# Patient Record
Sex: FEMALE | Race: OTHER | NOT HISPANIC OR LATINO | Employment: FULL TIME | URBAN - METROPOLITAN AREA
[De-identification: names, ages, dates, MRNs, and addresses within clinical notes are randomized per-mention and may not be internally consistent; named-entity substitution may affect disease eponyms.]

---

## 2017-05-24 ENCOUNTER — APPOINTMENT (OUTPATIENT)
Dept: MEDICAL GROUP | Facility: PHYSICIAN GROUP | Age: 32
End: 2017-05-24
Payer: COMMERCIAL

## 2017-06-05 RX ORDER — NORGESTIMATE AND ETHINYL ESTRADIOL 7DAYSX3 28
KIT ORAL
Qty: 84 TAB | Refills: 0 | OUTPATIENT
Start: 2017-06-05

## 2017-06-06 NOTE — TELEPHONE ENCOUNTER
I made patient an appointment to see  for birth control refill at earliest opened appointment which was 07/12/2017. Patient would like a refill in the mean time until then. LM

## 2017-06-07 NOTE — TELEPHONE ENCOUNTER
I reviewed her chart and it looks like I have not prescribed this medication before. I called patient and spoke with . Patient is sleeping. I instructed  have patient call us at [phone] to talk to Brenda otherwise I will get back with her on Thursday since I don't work tomorrow.

## 2017-06-12 ENCOUNTER — OFFICE VISIT (OUTPATIENT)
Dept: MEDICAL GROUP | Facility: PHYSICIAN GROUP | Age: 32
End: 2017-06-12
Payer: COMMERCIAL

## 2017-06-12 VITALS
HEART RATE: 68 BPM | HEIGHT: 64 IN | BODY MASS INDEX: 34.66 KG/M2 | TEMPERATURE: 97.5 F | WEIGHT: 203 LBS | DIASTOLIC BLOOD PRESSURE: 60 MMHG | OXYGEN SATURATION: 98 % | SYSTOLIC BLOOD PRESSURE: 94 MMHG

## 2017-06-12 DIAGNOSIS — Z13.220 ENCOUNTER FOR SCREENING FOR LIPID DISORDER: ICD-10-CM

## 2017-06-12 DIAGNOSIS — F43.21 SITUATIONAL DEPRESSION: ICD-10-CM

## 2017-06-12 DIAGNOSIS — Z13.21 ENCOUNTER FOR VITAMIN DEFICIENCY SCREENING: ICD-10-CM

## 2017-06-12 DIAGNOSIS — Z86.32 HISTORY OF GESTATIONAL DIABETES: ICD-10-CM

## 2017-06-12 DIAGNOSIS — E66.9 OBESITY (BMI 30-39.9): ICD-10-CM

## 2017-06-12 DIAGNOSIS — Z30.015 ENCOUNTER FOR INITIAL PRESCRIPTION OF VAGINAL RING HORMONAL CONTRACEPTIVE: ICD-10-CM

## 2017-06-12 DIAGNOSIS — Z13.29 SCREENING FOR THYROID DISORDER: ICD-10-CM

## 2017-06-12 PROCEDURE — 99214 OFFICE O/P EST MOD 30 MIN: CPT | Performed by: FAMILY MEDICINE

## 2017-06-12 RX ORDER — ETONOGESTREL AND ETHINYL ESTRADIOL VAGINAL RING .015; .12 MG/D; MG/D
RING VAGINAL
Qty: 3 EACH | Refills: 3 | Status: SHIPPED | OUTPATIENT
Start: 2017-06-12 | End: 2018-06-18

## 2017-06-12 ASSESSMENT — PAIN SCALES - GENERAL: PAINLEVEL: NO PAIN

## 2017-06-12 ASSESSMENT — PATIENT HEALTH QUESTIONNAIRE - PHQ9: CLINICAL INTERPRETATION OF PHQ2 SCORE: 2

## 2017-06-12 NOTE — MR AVS SNAPSHOT
"        Kristal Lopez   2017 2:20 PM   Office Visit   MRN: [id number]    Department:  [facility]   Dept Phone:  [phone]    Description:  Female : 1985   Provider:  Neeru Ghosh M.D.           Reason for Visit     Establish Care weight loss       Allergies as of 2017     No Active Allergies      You were diagnosed with     History of gestational diabetes   [646589]       Encounter for initial prescription of vaginal ring hormonal contraceptive   [5516392]       Obesity (BMI 30-39.9)   [670281]       Screening for thyroid disorder   [V77.0.ICD-9-CM]       Encounter for screening for lipid disorder   [2895656]       Encounter for vitamin deficiency screening   [397991]         Vital Signs     Blood Pressure Pulse Temperature Height Weight Body Mass Index    94/60 mmHg 68 36.4 °C (97.5 °F) 1.626 m (5' 4.02\") 92.08 kg (203 lb) 34.83 kg/m2    Oxygen Saturation Last Menstrual Period Breastfeeding? Smoking Status          98% 2017 No Never Smoker         Basic Information     Date Of Birth Sex Race Ethnicity Preferred Language    1985 Female Other Non- English      Your appointments     Jul 10, 2017  5:00 PM   Established Patient with Neeru Ghosh M.D.   [facility] ([address] [address] [phone]           You will be receiving a confirmation call a few days before your appointment from our automated call confirmation system.              Problem List              ICD-10-CM Priority Class Noted - Resolved    History of gestational diabetes Z86.32   2017 - Present    Encounter for initial prescription of vaginal ring hormonal contraceptive Z30.015   2017 - Present    Obesity (BMI 30-39.9) E66.9   2017 - Present      Health Maintenance        Date Due Completion Dates    PAP SMEAR 2018, 6/10/2013    IMM DTaP/Tdap/Td Vaccine (2 - Td) 2021            Current Immunizations    " Hepatitis A Vaccine, Adult 10/9/2015    Hepatitis B Vaccine Non-Recombivax (Ped/Adol) 10/30/2012, 4/16/2012, 2/24/2012    Influenza TIV (IM) 10/30/2012, 12/12/2011    Tdap Vaccine 12/12/2011      Below and/or attached are the medications your provider expects you to take. Review all of your home medications and newly ordered medications with your provider and/or pharmacist. Follow medication instructions as directed by your provider and/or pharmacist. Please keep your medication list with you and share with your provider. Update the information when medications are discontinued, doses are changed, or new medications (including over-the-counter products) are added; and carry medication information at all times in the event of emergency situations     Allergies:  No Known Allergies          Medications  Valid as of: June 12, 2017 -  3:18 PM    Generic Name Brand Name Tablet Size Instructions for use    Etonogestrel-Ethinyl Estradiol (RING) NUVARING 0.12-0.015 MG/24HR 1 ring vaginally X 3 weeks, off X 1 week        Naltrexone-Bupropion HCl (TABLET SR 12 HR) Naltrexone-Bupropion HCl ER 8-90 MG Take 1 tablet by mouth 2 Times a Day.        .                 Medicines prescribed today were sent to:     [facility]/PHARMACY #[id number] [facility], [place] - [address] [address]    Phone: [phone] Fax: [phone]    Open 24 Hours?: No      Medication refill instructions:       If your prescription bottle indicates you have medication refills left, it is not necessary to call your provider’s office. Please contact your pharmacy and they will refill your medication.    If your prescription bottle indicates you do not have any refills left, you may request refills at any time through one of the following ways: The online [employer] system (except Urgent Care), by calling your provider’s office, or by asking your pharmacy to contact your provider’s office with a refill request. Medication refills are processed only  during regular business hours and may not be available until the next business day. Your provider may request additional information or to have a follow-up visit with you prior to refilling your medication.   *Please Note: Medication refills are assigned a new Rx number when refilled electronically. Your pharmacy may indicate that no refills were authorized even though a new prescription for the same medication is available at the pharmacy. Please request the medicine by name with the pharmacy before contacting your provider for a refill.        Your To Do List     Future Labs/Procedures Complete By Expires    CBC WITH DIFFERENTIAL  As directed 6/13/2018    COMP METABOLIC PANEL  As directed 6/13/2018    HEMOGLOBIN A1C  As directed 6/13/2018    LIPID PROFILE  As directed 6/13/2018    TSH  As directed 6/13/2018    VITAMIN D,25 HYDROXY  As directed 6/13/2018         MyChart Status: Patient Declined

## 2017-06-12 NOTE — PROGRESS NOTES
Subjective:   Kristal Lopez is a 32 y.o. female here today to establish care with new provider and has the following concerns which include depressed mood, unable to lose weight.    1. Patient reports that she has felt sad and depressed for a long time. This is mostly related to stressors in her life including taking care of her kids and her relationship with her in-laws which is not good. She feels that her in-laws do not care for her and her family, however her  does not understand that and maintains good relationship with her in-laws. This bothers her from time to time and she feels sad about it occasionally. She denies any suicidal or homicidal ideation. In the past she was prescribed Prozac for her mood but she had taken it for one week and that has caused her irritability so she stopped.She also feels sad for not being able to lose weight.She does not want to be on any medication that can cause weight gain but she is willing try something that may help with her mood and also help with weight loss.    2. Patient reports that she has also been trying to lose weight but has been unsuccessful being very busy. She recently signed up for the gym and has been trying to go to the gym regularly but sometimes she falls off her schedule and occasionally she also tends to eat a lot when she is not doing enough physical activity. She also notes that she seems to have less energy.  She is wanting to try some medication for weight loss.    3. Patient recently has been on tri-Sprintec for contraception, but she often forgets to take the medication daily. Therefore she wants to try the Nuva Ring and is asking about it.    4. Patient has history of gestational diabetes with her pregnancies in 2007 and 2013. She has not had any routine blood tests in the recent past.     Current medicines (including changes today)  Current Outpatient Prescriptions   Medication Sig Dispense Refill   • Naltrexone-Bupropion HCl ER 8-90 MG  "TABLET SR 12 HR Take 1 tablet by mouth 2 Times a Day. 60 Tab 1   • ethinyl estradiol-etonogestrel (NUVARING) 0.12-0.015 MG/24HR vaginal ring 1 ring vaginally X 3 weeks, off X 1 week 3 Each 3     No current facility-administered medications for this visit.     She  has a past medical history of Pregnancy and Depressed affect (1/26/2016).    ROS   No chest pain, no shortness of breath, no abdominal pain  No nausea, vomiting  No cough, rash  No weakness, numbness, tingling       Objective:     Blood pressure 94/60, pulse 68, temperature 36.4 °C (97.5 °F), height 1.626 m (5' 4.02\"), weight 92.08 kg (203 lb), last menstrual period 06/12/2017, SpO2 98 %, not currently breastfeeding. Body mass index is 34.83 kg/(m^2).     PHYSICAL EXAM     GEN: Alert and oriented,well appearing, no acute distress  SKIN: warm, dry to touch, no rashes or lesions in visible areas  PSYCH: mood and affect normal, judgement normal  EYES: Conjunctiva clear, lids normal,pupils equal round and reactive  ENMT: Normal external nose and ears,EACs normal appearing, TM pearly gray with normal light reflex bilaterally,nasal mucosa and turbinates normal appearing without erythema or edema, lips without lesions,good dentition,oropharynx clear  Neck : Trachea midline, no masses or swelling, no thyromegaly  LYMPHATIC : No cervical or supraclavicular lymphadenopathy  RESPIRATORY : Unlabored respiratory effort, no distress noted, clear to auscultation bilaterally, no wheeze, rhonchi, crackles  CARDIOVASCULAR: RRR, S1 S2 normal, no murmurs , gallop , no carotid bruit, no edema of the extremities, pedal pulses B/L 2+  GI: Soft, Non tender, No rebound or guarding, no hepatosplenomegaly, no masses  MUSCULOSKELETAL : Normal gait and stance, no obvious abnormalities   NEURO: No overt focal neurologic deficits,sensation intact        Assessment and Plan:   The following treatment plan was discussed    1. History of gestational diabetes  New problem.No recent " labs.Will order HbA1C.  - HEMOGLOBIN A1C; Future    2. Encounter for initial prescription of vaginal ring hormonal contraceptive  New problem  Discussed method of administration and the possible side effects.Prescription given for Nuvaring.Advised insertion between day 1-5 of her menstrual cycle.She just started her menses today  - ethinyl estradiol-etonogestrel (NUVARING) 0.12-0.015 MG/24HR vaginal ring; 1 ring vaginally X 3 weeks, off X 1 week  Dispense: 3 Each; Refill: 3  - CBC WITH DIFFERENTIAL; Future  - COMP METABOLIC PANEL; Future    3. Obesity (BMI 30-39.9)  New problem.Rx given for Contrave and online discount coupon printed from Contrave website.  Discussed possible side effects.Continue diet and exercise.Reevaluate in 4 weeks.  If Contrave not covered we will try Wellbutrin which will help with her mood and also have additional weight loss   - Naltrexone-Bupropion HCl ER 8-90 MG TABLET SR 12 HR; Take 1 tablet by mouth 2 Times a Day.  Dispense: 60 Tab; Refill: 1  - Patient identified as having weight management issue.  Appropriate orders and counseling given.    4. Screening for thyroid disorder  - TSH; Future    5. Encounter for screening for lipid disorder  - LIPID PROFILE; Future    6. Encounter for vitamin deficiency screening  - VITAMIN D,25 HYDROXY; Future    7. Situational depression  Patient prescribed Contrave for weight loss.If insurance does not cover, we will try wellbutrin taht will likely help with both mood and weight loss.            Followup: Return in about 4 weeks (around 7/10/2017).         Please note that this dictation was created using voice recognition software. I have made every reasonable attempt to correct obvious errors, but I expect that there are errors of grammar and possibly content that I did not discover before finalizing the note.

## 2017-06-13 ENCOUNTER — TELEPHONE (OUTPATIENT)
Dept: MEDICAL GROUP | Facility: PHYSICIAN GROUP | Age: 32
End: 2017-06-13

## 2017-06-13 PROBLEM — F43.21 SITUATIONAL DEPRESSION: Status: ACTIVE | Noted: 2017-06-13

## 2017-06-13 RX ORDER — BUPROPION HYDROCHLORIDE 150 MG/1
150 TABLET, EXTENDED RELEASE ORAL 2 TIMES DAILY
Qty: 60 TAB | Refills: 3 | Status: SHIPPED | OUTPATIENT
Start: 2017-06-13 | End: 2018-06-18

## 2017-06-13 NOTE — TELEPHONE ENCOUNTER
Patient requesting Bupropion as discussed in her appt because the contrave weight loss medication it not covered on her insurance

## 2017-10-07 ENCOUNTER — HOSPITAL ENCOUNTER (EMERGENCY)
Facility: MEDICAL CENTER | Age: 32
End: 2017-10-07
Attending: EMERGENCY MEDICINE
Payer: COMMERCIAL

## 2017-10-07 ENCOUNTER — HOSPITAL ENCOUNTER (OUTPATIENT)
Dept: RADIOLOGY | Facility: MEDICAL CENTER | Age: 32
End: 2017-10-07
Attending: PHYSICIAN ASSISTANT
Payer: COMMERCIAL

## 2017-10-07 ENCOUNTER — OFFICE VISIT (OUTPATIENT)
Dept: URGENT CARE | Facility: PHYSICIAN GROUP | Age: 32
End: 2017-10-07
Payer: COMMERCIAL

## 2017-10-07 VITALS
DIASTOLIC BLOOD PRESSURE: 70 MMHG | HEART RATE: 84 BPM | WEIGHT: 205.69 LBS | TEMPERATURE: 99.8 F | HEIGHT: 64 IN | OXYGEN SATURATION: 98 % | RESPIRATION RATE: 16 BRPM | BODY MASS INDEX: 35.12 KG/M2 | SYSTOLIC BLOOD PRESSURE: 136 MMHG

## 2017-10-07 VITALS
RESPIRATION RATE: 16 BRPM | SYSTOLIC BLOOD PRESSURE: 100 MMHG | OXYGEN SATURATION: 100 % | TEMPERATURE: 98.1 F | HEART RATE: 72 BPM | HEIGHT: 64 IN | DIASTOLIC BLOOD PRESSURE: 60 MMHG

## 2017-10-07 DIAGNOSIS — F43.21 SITUATIONAL DEPRESSION: ICD-10-CM

## 2017-10-07 DIAGNOSIS — Z3A.08 8 WEEKS GESTATION OF PREGNANCY: ICD-10-CM

## 2017-10-07 DIAGNOSIS — M79.10 MYALGIA: ICD-10-CM

## 2017-10-07 DIAGNOSIS — O20.0 THREATENED MISCARRIAGE: ICD-10-CM

## 2017-10-07 DIAGNOSIS — N93.9 VAGINAL SPOTTING: ICD-10-CM

## 2017-10-07 DIAGNOSIS — J02.9 SORE THROAT: ICD-10-CM

## 2017-10-07 LAB
APPEARANCE UR: ABNORMAL
B-HCG SERPL-ACNC: 5250 MIU/ML (ref 0–10)
BACTERIA #/AREA URNS HPF: ABNORMAL /HPF
BASOPHILS # BLD AUTO: 0.4 % (ref 0–1.8)
BASOPHILS # BLD: 0.04 K/UL (ref 0–0.12)
BILIRUB UR QL STRIP.AUTO: NEGATIVE
COLOR UR: YELLOW
CULTURE IF INDICATED INDCX: NO UA CULTURE
EOSINOPHIL # BLD AUTO: 0.13 K/UL (ref 0–0.51)
EOSINOPHIL NFR BLD: 1.2 % (ref 0–6.9)
EPI CELLS #/AREA URNS HPF: ABNORMAL /HPF
ERYTHROCYTE [DISTWIDTH] IN BLOOD BY AUTOMATED COUNT: 38.6 FL (ref 35.9–50)
GLUCOSE UR STRIP.AUTO-MCNC: NEGATIVE MG/DL
HCT VFR BLD AUTO: 38.1 % (ref 37–47)
HGB BLD-MCNC: 12.7 G/DL (ref 12–16)
IMM GRANULOCYTES # BLD AUTO: 0.03 K/UL (ref 0–0.11)
IMM GRANULOCYTES NFR BLD AUTO: 0.3 % (ref 0–0.9)
INT CON NEG: NEGATIVE
INT CON POS: POSITIVE
KETONES UR STRIP.AUTO-MCNC: NEGATIVE MG/DL
LEUKOCYTE ESTERASE UR QL STRIP.AUTO: NEGATIVE
LYMPHOCYTES # BLD AUTO: 2.23 K/UL (ref 1–4.8)
LYMPHOCYTES NFR BLD: 20.6 % (ref 22–41)
MCH RBC QN AUTO: 27.9 PG (ref 27–33)
MCHC RBC AUTO-ENTMCNC: 33.3 G/DL (ref 33.6–35)
MCV RBC AUTO: 83.7 FL (ref 81.4–97.8)
MICRO URNS: ABNORMAL
MONOCYTES # BLD AUTO: 0.51 K/UL (ref 0–0.85)
MONOCYTES NFR BLD AUTO: 4.7 % (ref 0–13.4)
MUCOUS THREADS #/AREA URNS HPF: ABNORMAL /HPF
NEUTROPHILS # BLD AUTO: 7.9 K/UL (ref 2–7.15)
NEUTROPHILS NFR BLD: 72.8 % (ref 44–72)
NITRITE UR QL STRIP.AUTO: NEGATIVE
NRBC # BLD AUTO: 0 K/UL
NRBC BLD AUTO-RTO: 0 /100 WBC
NUMBER OF RH DOSES IND 8505RD: NORMAL
PH UR STRIP.AUTO: 6.5 [PH]
PLATELET # BLD AUTO: 226 K/UL (ref 164–446)
PMV BLD AUTO: 10.8 FL (ref 9–12.9)
PROT UR QL STRIP: NEGATIVE MG/DL
RBC # BLD AUTO: 4.55 M/UL (ref 4.2–5.4)
RBC # URNS HPF: ABNORMAL /HPF
RBC UR QL AUTO: ABNORMAL
RH BLD: NORMAL
S PYO AG THROAT QL: NORMAL
SP GR UR STRIP.AUTO: 1.02
WBC # BLD AUTO: 10.8 K/UL (ref 4.8–10.8)
WBC #/AREA URNS HPF: ABNORMAL /HPF

## 2017-10-07 PROCEDURE — 86901 BLOOD TYPING SEROLOGIC RH(D): CPT

## 2017-10-07 PROCEDURE — 87880 STREP A ASSAY W/OPTIC: CPT | Performed by: PHYSICIAN ASSISTANT

## 2017-10-07 PROCEDURE — 99214 OFFICE O/P EST MOD 30 MIN: CPT | Performed by: PHYSICIAN ASSISTANT

## 2017-10-07 PROCEDURE — 81001 URINALYSIS AUTO W/SCOPE: CPT

## 2017-10-07 PROCEDURE — 85025 COMPLETE CBC W/AUTO DIFF WBC: CPT

## 2017-10-07 PROCEDURE — 84702 CHORIONIC GONADOTROPIN TEST: CPT

## 2017-10-07 PROCEDURE — 99283 EMERGENCY DEPT VISIT LOW MDM: CPT

## 2017-10-07 PROCEDURE — 76817 TRANSVAGINAL US OBSTETRIC: CPT

## 2017-10-07 RX ORDER — ACETAMINOPHEN 500 MG
TABLET ORAL
Status: DISCONTINUED
Start: 2017-10-07 | End: 2017-10-08 | Stop reason: HOSPADM

## 2017-10-08 NOTE — ED NOTES
Patient states he last period  was 8/9 and is currently about 5 weeks pregnant. States has been bleeding since yesterday. Patient has had flu like systems  For 3 days.

## 2017-10-08 NOTE — ED PROVIDER NOTES
"ED Provider Note    CHIEF COMPLAINT  Chief Complaint   Patient presents with   • Pregnancy   • Vaginal Bleeding   • Chills   • Sore Throat       [facility]    Primary care provider: Neeru Ghosh M.D.  Means of arrival: POV  History obtained from: pt and   History limited by: nothing    Kristal John is a 32 y.o. female who presents with vaginal bleeding, 1st trimester. Began with spotting 2d ago, heavier this morning, seen at  and had TVUS performed, told to come to ER if any worse. Not worse, but continuing steady slow bleeding with 2 pads all day today. No clots. No abd pain, cramping, vomiting. No fevers, sore throat, cough, sob, cp, swelling, rashes, headaches. No h/o coagulopathy. , 2 prior abortions (elective). No syncope.     REVIEW OF SYSTEMS  See HPI for further details. All other systems are negative.     PAST MEDICAL HISTORY   has a past medical history of Depressed affect (2016) and Pregnancy.    PAST FAMILY HISTORY  Family History   Problem Relation Age of Onset   • Cancer Neg Hx    • Diabetes Neg Hx    • Heart Attack Neg Hx    • Hyperlipidemia Neg Hx        SOCIAL HISTORY  Social History     Social History Main Topics   • Smoking status: Never Smoker   • Smokeless tobacco: Never Used   • Alcohol use No   • Drug use: No   • Sexual activity: Yes     Partners: Male      Comment: student       SURGICAL HISTORY   has a past surgical history that includes other.    CURRENT MEDICATIONS  Home Medications    **Home medications have not yet been reviewed for this encounter**         ALLERGIES  No Active Allergies    PHYSICAL EXAM  VITAL SIGNS: /70   Pulse 84   Temp 37.7 °C (99.8 °F)   Resp 16   Ht 1.626 m (5' 4\")   Wt 93.3 kg (205 lb 11 oz)   SpO2 98%   BMI 35.31 kg/m²    Pulse ox interpretation: on room air, I interpret this pulse ox as normal.  Constitutional: Well developed, well nourished. No acute distress.  HEENT: Normocephalic, atraumatic. Posterior pharynx clear, mucous " membranes moist.  Eyes:  EOMI. Normal sclera.  Neck: Supple, Full range of motion, nontender.  Chest/Pulmonary: Clear to ausculation bilaterally, no wheezes or rhonchi.  Cardio: Regular rate and rhythm, no murmur.   Abdomen: Soft, nontender, no rebound, guarding, or masses.  Back: No CVA tenderness, nontender midline, no step offs.  Musculoskeletal: No deformity, no edema, neurovascular intact.   Neuro: Clear speech, appropriate, cooperative, cranial nerves II-XII grossly intact.  Psych: sad mood but appropriate.  Skin: No rashes, warm and dry.    DIAGNOSTIC STUDIES / PROCEDURES    LABS & EKG  Results for orders placed or performed during the hospital encounter of 10/07/17   CBC WITH DIFFERENTIAL   Result Value Ref Range    WBC 10.8 4.8 - 10.8 K/uL    RBC 4.55 4.20 - 5.40 M/uL    Hemoglobin 12.7 12.0 - 16.0 g/dL    Hematocrit 38.1 37.0 - 47.0 %    MCV 83.7 81.4 - 97.8 fL    MCH 27.9 27.0 - 33.0 pg    MCHC 33.3 (L) 33.6 - 35.0 g/dL    RDW 38.6 35.9 - 50.0 fL    Platelet Count 226 164 - 446 K/uL    MPV 10.8 9.0 - 12.9 fL    Neutrophils-Polys 72.80 (H) 44.00 - 72.00 %    Lymphocytes 20.60 (L) 22.00 - 41.00 %    Monocytes 4.70 0.00 - 13.40 %    Eosinophils 1.20 0.00 - 6.90 %    Basophils 0.40 0.00 - 1.80 %    Immature Granulocytes 0.30 0.00 - 0.90 %    Nucleated RBC 0.00 /100 WBC    Neutrophils (Absolute) 7.90 (H) 2.00 - 7.15 K/uL    Lymphs (Absolute) 2.23 1.00 - 4.80 K/uL    Monos (Absolute) 0.51 0.00 - 0.85 K/uL    Eos (Absolute) 0.13 0.00 - 0.51 K/uL    Baso (Absolute) 0.04 0.00 - 0.12 K/uL    Immature Granulocytes (abs) 0.03 0.00 - 0.11 K/uL    NRBC (Absolute) 0.00 K/uL   RH TYPE FOR RHOGAM FROM E.D.   Result Value Ref Range    Emergency Department Rh Typing POS     Number Of Rh Doses Indicated ZERO    BETA-HCG QUANTITATIVE SERUM   Result Value Ref Range    Bhcg 5250.0 (H) 0.0 - 10.0 mIU/mL   URINALYSIS,CULTURE IF INDICATED   Result Value Ref Range    Color Yellow     Character Hazy (A)     Specific Gravity 1.025  <1.035    Ph 6.5 5.0 - 8.0    Glucose Negative Negative mg/dL    Ketones Negative Negative mg/dL    Protein Negative Negative mg/dL    Bilirubin Negative Negative    Nitrite Negative Negative    Leukocyte Esterase Negative Negative    Occult Blood Large (A) Negative    Micro Urine Req Microscopic     Culture Indicated No UA Culture   URINE MICROSCOPIC (W/UA)   Result Value Ref Range    WBC Rare /hpf    RBC 20-50 (A) /hpf    Bacteria Few (A) None /hpf    Epithelial Cells Few Few /hpf    Mucous Threads Moderate /hpf       PROCEDURES  I personally performed a bedside us of the patient's pelvis/abdomen in tv and longitudinal planes. Gestational sac, no obvious pole or FHR, consistent with TVUS reviewed from earlier today. Impression: no definite IUP.     COURSE & MEDICAL DECISION MAKING    This is a 32 y.o. female who presents with vaginal bleeding in the 1st trimester.     Differential Diagnosis includes but is not limited to:  Spontaneous miscarriage, incomplete ab, threatened ab, ectopic, molar    ED Course:  Plan hcg, bedside us, cbc, reeval.  Records review show TVUS with sac, no obvious pole or FHR. Consistent with my bedside US.  Quant 5000, I discussed with pt and  my concern that we should see more of a fetus with that hcg and i'm worried about incomplete vs inevitable vs missed ab. Pt has ob care, and will f/u in 48 hours without fail, and if can't see her obgyn will return to the ER for a recheck of hcg.   Soft abdomen, cbc stable doubt anemia. No FF on bedside US doubt ruptured ectopic with normal vs and reassuring exam.  UA no e/o infection, will cx.  Miscarriage counseling provided.  Return precautions understood.    Medications - No data to display    FINAL IMPRESSION  1. Threatened miscarriage    2. Situational depression        PRESCRIPTIONS  Discharge Medication List as of 10/7/2017 11:09 PM          FOLLOW UP  Neeru Ghosh M.D.  [address] [facility]  [address]  [phone]    Schedule an appointment as soon as possible for a visit in 2 days      Your OBGYN    Schedule an appointment as soon as possible for a visit in 2 days      [facility], Emergency Dept  [address]  [place] [address]  [phone]  In 1 day  if bleeding worsens or you have worse pain, and come back in 2 days if you can't follow up in clinic        -DISCHARGE-       Results, exam findings, clinical impression, presumed diagnosis, treatment options, and strict return precautions were discussed with the patient and family, and they verbalized understanding, agreed with, and appreciated the plan of care.    Pertinent Labs & Imaging studies reviewed and verified by myself, as well as nursing notes and the patient's past medical, family, and social histories (See chart for details).    The patient is referred to her OB and her primary physician for blood pressure management, diabetic screening, and for all other preventative health concerns.     Portions of this record were made with voice recognition software.  Despite my review, spelling/grammar/context errors may still remain.  Interpretation of this chart should be taken in this context.     Electronically signed by Eliceo Holden on 10/8/2017 at 12:38 PM.

## 2017-10-08 NOTE — ED NOTES
"/64   Pulse 97   Temp 37.7 °C (99.8 °F)   Resp 18   Ht 1.626 m (5' 4\")   Wt 93.3 kg (205 lb 11 oz)   SpO2 98%   BMI 35.31 kg/m²   Chief Complaint   Patient presents with   • Pregnancy   • Vaginal Bleeding   • Chills   • Sore Throat       "

## 2017-10-08 NOTE — DISCHARGE INSTRUCTIONS
You were seen and evaluated in the Emergency Department at [facility] for:     Vaginal bleeding in the first trimester.     You had the following tests and studies:    Blood tests, ultrasound. We are concerned you are having, may have, or have had a miscarriage. You MUST have your blood checked for the pregnancy hormone, HCG, in 2 days. Go to your OBGYN's office, or return to the ER for a recheck of this.     ----------------------------    We always encourage patients to return IMMEDIATELY if they have:  Increased or changing pain, passing out, fevers over 100.4 (taken in your mouth or rectally) for more than 2 days, redness or swelling of skin or tissues, feeling like your heart is beating fast, chest pain that is new or worsening, trouble breathing, feeling like your throat is closing up and can not breath, inability to walk, weakness of any part of your body, new dizziness, severe bleeding that won't stop from any part of your body, if you can't eat or drink, or if you have any other concerns.   If you feel worse, please know that you can always return with any questions, concerns, worse symptoms, or you are feeling unsafe. We certainly cannot say for sure that we have ruled out every illness or dangerous disease, but we feel that at this specific time, your exam, tests, and vital signs like heart rate and blood pressure are safe for discharge.         Threatened Miscarriage  A threatened miscarriage is when you have vaginal bleeding during your first 20 weeks of pregnancy but the pregnancy has not ended. Your doctor will do tests to make sure you are still pregnant. The cause of the bleeding may not be known. This condition does not mean your pregnancy will end. It does increase the risk of it ending (complete miscarriage).  HOME CARE   · Make sure you keep all your doctor visits for prenatal care.  · Get plenty of rest.  · Do not have sex or use tampons if you have vaginal bleeding.  · Do not  douche.  · Do not smoke or use drugs.  · Do not drink alcohol.  · Avoid caffeine.  GET HELP IF:  · You have light bleeding from your vagina.  · You have belly pain or cramping.  · You have a fever.  GET HELP RIGHT AWAY IF:   · You have heavy bleeding from your vagina.  · You have clots of blood coming from your vagina.  · You have bad pain or cramps in your low back or belly.  · You have fever, chills, and bad belly pain.  MAKE SURE YOU:   · Understand these instructions.  · Will watch your condition.  · Will get help right away if you are not doing well or get worse.     This information is not intended to replace advice given to you by your health care provider. Make sure you discuss any questions you have with your health care provider.     Document Released: 11/30/2009 Document Revised: 12/23/2014 Document Reviewed: 10/14/2014  [employer] Patient Education ©2016 [employer]

## 2017-10-10 ASSESSMENT — ENCOUNTER SYMPTOMS
SORE THROAT: 1
ABDOMINAL PAIN: 0
DIZZINESS: 0
VOMITING: 0
NECK PAIN: 0
MYALGIAS: 1
FEVER: 0
WHEEZING: 0
EYE REDNESS: 0
HEADACHES: 0
TINGLING: 0
EYE DISCHARGE: 0
COUGH: 0
DIARRHEA: 0
CHILLS: 0

## 2017-10-10 NOTE — PROGRESS NOTES
"Subjective:      Kristal Lopez is a 32 y.o. female who presents with Fever (chillsx 2 days); Spotting (x 2 days pt is 8 weeks pregnant); and Pharyngitis            Pt is 31 y/o female who presents with sore throat since yesterday with body aches, she is concerned about strep pharyngitis at this time. She also reports that she became worried this morning when she wiped she was a having a little vaginal spotting. She reports being 8 weeks pregnant- she denies any pain, she is not passing clots.   She denies any cramps. She is  at this time.   She admits that she did not have vaginal spotting in the past with her other pregnancies.   Pt. Was very adamant about wanting an ultrasound done today.       Pharyngitis    This is a new problem. The current episode started yesterday. The problem has been unchanged. There has been no fever. The pain is at a severity of 2/10. The pain is mild. Pertinent negatives include no abdominal pain, congestion, coughing, diarrhea, ear discharge, headaches, neck pain or vomiting. She has had no exposure to strep or mono. She has tried cool liquids for the symptoms.       Review of Systems   Constitutional: Negative for chills, fever and malaise/fatigue.   HENT: Positive for sore throat. Negative for congestion and ear discharge.    Eyes: Negative for discharge and redness.   Respiratory: Negative for cough and wheezing.    Cardiovascular: Negative for chest pain and leg swelling.   Gastrointestinal: Negative for abdominal pain, diarrhea and vomiting.        Pos. For vaginal spotting     Genitourinary: Negative for dysuria and urgency.   Musculoskeletal: Positive for myalgias. Negative for neck pain.   Skin: Negative for itching and rash.   Neurological: Negative for dizziness, tingling and headaches.          Objective:     /60   Pulse 72   Temp 36.7 °C (98.1 °F)   Resp 16   Ht 1.626 m (5' 4.02\")   SpO2 100%    PMH:  has a past medical history of Depressed affect (2016) " and Pregnancy.  MEDS:   Current Outpatient Prescriptions:   •  buPROPion SR (WELLBUTRIN-SR) 150 MG TABLET SR 12 HR sustained-release tablet, Take 1 Tab by mouth 2 times a day., Disp: 60 Tab, Rfl: 3  •  ethinyl estradiol-etonogestrel (NUVARING) 0.12-0.015 MG/24HR vaginal ring, 1 ring vaginally X 3 weeks, off X 1 week, Disp: 3 Each, Rfl: 3  ALLERGIES: No Active Allergies  SURGHX:   Past Surgical History:   Procedure Laterality Date   • OTHER      LASIK     SOCHX:  reports that she has never smoked. She has never used smokeless tobacco. She reports that she does not drink alcohol or use drugs.  FH: Family history was reviewed, no pertinent findings to report    Physical Exam   Constitutional: She is oriented to person, place, and time. She appears well-developed and well-nourished.   HENT:   Head: Normocephalic and atraumatic.   Right Ear: External ear normal.   Left Ear: External ear normal.   Nose: Nose normal.   Mouth/Throat: No oropharyngeal exudate.   Eyes: EOM are normal. Pupils are equal, round, and reactive to light.   Neck: Normal range of motion. Neck supple.   Cardiovascular: Normal rate and regular rhythm.    Pulmonary/Chest: Effort normal and breath sounds normal. No respiratory distress.   Abdominal: Soft. Bowel sounds are normal. She exhibits no distension. There is no tenderness. There is no rebound and no guarding.   Genitourinary:   Genitourinary Comments: Pelvic declined     Musculoskeletal: Normal range of motion. She exhibits no edema.   Lymphadenopathy:     She has no cervical adenopathy.   Neurological: She is alert and oriented to person, place, and time.   Skin: Skin is warm. No rash noted.   Psychiatric: She has a normal mood and affect. Her behavior is normal.   Vitals reviewed.         Ultrasound B pelvic:    Intrauterine gestational sac containing yolk sac with no fetal pole identified at this time.    Right ovarian corpus luteum cyst.    Strep negative.     Assessment/Plan:     1. Sore  throat  - POCT Rapid Strep A    2. 8 weeks gestation of pregnancy  - POCT Rapid Strep A  - US-OB PELVIS TRANSVAGINAL; Future    3. Myalgia  4. Vaginal spotting  - US-OB PELVIS TRANSVAGINAL; Future    At this time we are not able to conduct timely labs- I expressed my concern about possible threated , and possible miscarriage at this time. Discussed supportive therapies for sore throat.   Also STRONGLY encouraged pt. To see OB/GYN ASAP to follow HCG- encouraged her to go to the ER if she develops any light-headedness, Abd. Pain, increase bleeding, or further concern and we are not open.   Patient given precautionary s/sx that mandate immediate follow up and evaluation in the ED. Advised of risks of not doing so.    DDX, Supportive care, and indications for immediate follow-up discussed with patient.    Instructed to return to clinic or nearest emergency department if we are not available for any change in condition, further concerns, or worsening of symptoms.    The patient demonstrated a good understanding and agreed with the treatment plan.

## 2018-01-29 ENCOUNTER — APPOINTMENT (OUTPATIENT)
Dept: MEDICAL GROUP | Facility: PHYSICIAN GROUP | Age: 33
End: 2018-01-29
Payer: COMMERCIAL

## 2018-06-18 ENCOUNTER — HOSPITAL ENCOUNTER (OUTPATIENT)
Facility: MEDICAL CENTER | Age: 33
End: 2018-06-18
Attending: FAMILY MEDICINE
Payer: COMMERCIAL

## 2018-06-18 ENCOUNTER — OFFICE VISIT (OUTPATIENT)
Dept: MEDICAL GROUP | Facility: PHYSICIAN GROUP | Age: 33
End: 2018-06-18
Payer: COMMERCIAL

## 2018-06-18 VITALS
DIASTOLIC BLOOD PRESSURE: 72 MMHG | WEIGHT: 208 LBS | BODY MASS INDEX: 35.51 KG/M2 | OXYGEN SATURATION: 95 % | HEART RATE: 87 BPM | TEMPERATURE: 98.1 F | SYSTOLIC BLOOD PRESSURE: 118 MMHG | HEIGHT: 64 IN

## 2018-06-18 DIAGNOSIS — Z12.4 CERVICAL CANCER SCREENING: ICD-10-CM

## 2018-06-18 DIAGNOSIS — Z11.51 SCREENING FOR HPV (HUMAN PAPILLOMAVIRUS): ICD-10-CM

## 2018-06-18 DIAGNOSIS — Z01.419 ENCOUNTER FOR WELL WOMAN EXAM WITH ROUTINE GYNECOLOGICAL EXAM: ICD-10-CM

## 2018-06-18 DIAGNOSIS — Z30.011 ENCOUNTER FOR INITIAL PRESCRIPTION OF CONTRACEPTIVE PILLS: ICD-10-CM

## 2018-06-18 PROCEDURE — 87624 HPV HI-RISK TYP POOLED RSLT: CPT

## 2018-06-18 PROCEDURE — 99395 PREV VISIT EST AGE 18-39: CPT | Performed by: FAMILY MEDICINE

## 2018-06-18 PROCEDURE — 88175 CYTOPATH C/V AUTO FLUID REDO: CPT

## 2018-06-18 PROCEDURE — 99000 SPECIMEN HANDLING OFFICE-LAB: CPT | Performed by: FAMILY MEDICINE

## 2018-06-18 RX ORDER — DROSPIRENONE AND ETHINYL ESTRADIOL 0.02-3(28)
1 KIT ORAL DAILY
Qty: 28 TAB | Refills: 11 | Status: SHIPPED | OUTPATIENT
Start: 2018-06-18 | End: 2022-08-10

## 2018-06-18 ASSESSMENT — PATIENT HEALTH QUESTIONNAIRE - PHQ9: CLINICAL INTERPRETATION OF PHQ2 SCORE: 0

## 2018-06-18 ASSESSMENT — PAIN SCALES - GENERAL: PAINLEVEL: 6=MODERATE PAIN

## 2018-06-18 NOTE — PROGRESS NOTES
SUBJECTIVE: 33 y.o. female for annual routine gynecologic exam  Chief Complaint   Patient presents with   • Annual Exam       Obstetric History       T2      L2     SAB0   TAB1   Ectopic0   Molar0   Multiple0   Live Births1       Last Pap:   History   Sexual Activity   • Sexual activity: Yes   • Partners: Male     Comment: student     Sexual history: currently sexually active   H/O Abnormal Pap no  She  reports that she has never smoked. She has never used smokeless tobacco.      Patient would like to discuss alternative birth control options.Was on Nuvaring which caused nausea, stopped it.Would like options with minimal side effects.LMP : 4 days back.Has not been sexually active in the last few weeks.    Allergies: Patient has no active allergies.     ROS:    Menses every month with 5 days moderate bleeding   Cramping is mild.   She does not take OTC analgesics for cramping  No significant bloating/fluid retention, pelvic pain, or dyspareunia. No vaginal discharge   No breast tenderness, mass, nipple discharge, changes in size or contour, or abnormal cyclic discomfort.  No urinary tract symptoms, no incontinence, no polydipsia, polyuria,  No abdominal pain, change in bowel habits, black or bloody stools.    No unusual headaches, no visual changes, menstrual migraines   No prolonged cough. No dyspnea or chest pain on exertion.  No depression, labile mood, anxiety, libido changes, insomnia.  No temperature intolerance.  No new/concerning skin lesions, concerns.     Exercise: sporadic irregular exercise      Current medicines (including changes today)  Current Outpatient Prescriptions   Medication Sig Dispense Refill   • drospirenone-ethinyl estradiol (GLENDY) 3-0.02 MG per tablet Take 1 Tab by mouth every day. 28 Tab 11     No current facility-administered medications for this visit.      She  has a past medical history of Depressed affect (2016) and Pregnancy.  She  has a past surgical history  "that includes other.     Family History:   Family History   Problem Relation Age of Onset   • Cancer Neg Hx    • Diabetes Neg Hx    • Heart Attack Neg Hx    • Hyperlipidemia Neg Hx        OBJECTIVE:   /72   Pulse 87   Temp 36.7 °C (98.1 °F)   Ht 1.626 m (5' 4\")   Wt 94.3 kg (208 lb)   SpO2 95%   BMI 35.70 kg/m²   Body mass index is 35.7 kg/m².    HEAD AND NECK:  Ears normal.  Throat, oral cavity and tongue normal.  Neck supple. No adenopathy or masses in the neck or supraclavicular regions.  No carotid bruits. No thyromegaly. NEURO: Cranial nerves are normal. DTR's normal and symmetric.  CHEST:  Clear, good air entry, no wheezes or rales. HEART:  Regular rate and rhythm.  S1 and S2 normal.   No edema or JVD. ABDOMEN:  Soft without tenderness, guarding, mass or organomegaly.  No CVA tenderness or inguinal adenopathy. EXTREMITIES:  Extremities, reflexes and peripheral pulses are normal. SKIN: color normal, vascularity normal, no edema, temperature normal   No rashes or suspicious skin lesions noted.     Breast Exam: Performed with instruction during examination. No axillary lymphadenopathy, no skin changes, no dominant masses. No nipple retraction  Pelvic Exam -  Normal external genitalia with no lesions. Vaginal Mucosa:  normal vaginal mucosa . Cervix with no visible lesions. No cervical motion tenderness. Uterus is normal sized with no masses. No adnexal tenderness or enlargement appreciated. Thin Prep Pap is obtainedand specimen(s) sent to lab    <ASSESSMENT and PLAN>  1. Encounter for initial prescription of contraceptive pills  After discussion of multiple contraceptive methods, patient decided to try low dose OCPs.Prescription sent as below.Reviewed side effects.We discussed that birth control pills can increase risk of blood clots, liver tumors, gallbladder disease, and stroke. Side effects can include headache, constipation nausea. They do not protect against STDs. They are not effective if not " taken everyday, if more than one pill is missed a backup method should be used.   drospirenone-ethinyl estradiol (GLENDY) 3-0.02 MG per tablet   2. Encounter for well woman exam with routine gynecological exam  THINPREP PAP WITH HPV    COMP METABOLIC PANEL    LIPID PROFILE    TSH WITH REFLEX TO FT4    VITAMIN D,25 HYDROXY    HEMOGLOBIN A1C   3. Cervical cancer screening  THINPREP PAP WITH HPV   4. Screening for HPV (human papillomavirus)  THINPREP PAP WITH HPV       Discussed  breast self exam, feminine hygiene, adequate intake of calcium and vitamin D, diet and exercise   Follow-up in 1 years for next Gyn exam and 5 years for next Pap.   Next office visit for recheck of chronic medical conditions is due in 6 months      Please note that this dictation was created using voice recognition software. I have made every reasonable attempt to correct obvious errors, but I expect that there are errors of grammar and possibly content that I did not discover before finalizing the note.

## 2018-06-19 DIAGNOSIS — Z01.419 ENCOUNTER FOR WELL WOMAN EXAM WITH ROUTINE GYNECOLOGICAL EXAM: ICD-10-CM

## 2018-06-19 DIAGNOSIS — Z11.51 SCREENING FOR HPV (HUMAN PAPILLOMAVIRUS): ICD-10-CM

## 2018-06-19 DIAGNOSIS — Z12.4 CERVICAL CANCER SCREENING: ICD-10-CM

## 2018-06-20 ENCOUNTER — TELEPHONE (OUTPATIENT)
Dept: MEDICAL GROUP | Facility: PHYSICIAN GROUP | Age: 33
End: 2018-06-20

## 2018-06-20 DIAGNOSIS — E66.9 OBESITY (BMI 30-39.9): ICD-10-CM

## 2018-06-20 LAB
CYTOLOGY REG CYTOL: NORMAL
HPV HR 12 DNA CVX QL NAA+PROBE: NEGATIVE
HPV16 DNA SPEC QL NAA+PROBE: NEGATIVE
HPV18 DNA SPEC QL NAA+PROBE: NEGATIVE
SPECIMEN SOURCE: NORMAL

## 2018-06-20 NOTE — TELEPHONE ENCOUNTER
I have sent a prescription for Contrave.Patient will need to see a physician/provider for future refills and follow up.    Neeru Ghosh M.D.

## 2018-06-21 NOTE — TELEPHONE ENCOUNTER
VOICEMAIL  1. Caller Name: Kristal Lopez                      Call Back Number: [phone] (home)     2. Message: patient needs her contrave prescription for 6 mo refill, she is establishing with a new provider    3. Patient approves office to leave a detailed voicemail/MyChart message: N\A

## 2018-06-25 ENCOUNTER — TELEPHONE (OUTPATIENT)
Dept: MEDICAL GROUP | Facility: PHYSICIAN GROUP | Age: 33
End: 2018-06-25

## 2018-06-25 NOTE — TELEPHONE ENCOUNTER
1. Caller Name: [facility]                                         Call Back Number: [phone]      Patient approves a detailed voicemail message: N\A    Patient has requested the pharmacy to get a prescription of Ondansetron ODT 8 mg for her nausea and vomiting. Please advise

## 2018-06-25 NOTE — TELEPHONE ENCOUNTER
This medication can have side effects.  I recommend she be seen for an appointment if her nausea and vomiting are bad enough where she needs a medication.  Dr. Davenport covering for Dr. Ghosh

## 2018-06-25 NOTE — TELEPHONE ENCOUNTER
It is possible, especially if she started it recently or increased the dose.  She may hold off for a few days and see if her symptoms improve.  Dr. Davenport covering for Dr. Ghosh

## 2018-06-25 NOTE — TELEPHONE ENCOUNTER
----- Message from Re Davenport M.D. sent at 6/24/2018  7:44 PM PDT -----  Negative pap smear.  Dr. Davenport covering for Dr. Ghosh

## 2018-06-25 NOTE — TELEPHONE ENCOUNTER
I recommend a bland diet (dry toast, bananas, rice, applesauce) and lots of water.  If her symptoms do not improve, she should be seen for an appointment.  -Dr. Davenport covering for Dr. Ghosh

## 2018-06-25 NOTE — TELEPHONE ENCOUNTER
Spoke to pt she will call back and make an appt.  She wants the zofran to be prescribed without an appt I explained to her she needs to be seen.

## 2018-06-25 NOTE — TELEPHONE ENCOUNTER
Spoke to Kristal she thinks its from the Contrave medication and wants to know if she should continue taking it?

## 2018-06-25 NOTE — LETTER
"June 25, 2018         Tu John  [address] [address]        Dear Tu:  Your pap smear is normal.  Dr. Davenport covering for Dr. Ghosh     Below are the results from your recent visit:    Resulted Orders   THINPREP PAP WITH HPV   Result Value Ref Range    Cytology Reg See Path Report       Comment:      A separate pathology report will follow after the Cytology  specimen has been received by the laboratory and the results  are signed out by a pathologist.  Please see \"Pathology GYN Specimen\" order for these results.      Source Cervical     HPV Genotype 16 Negative Negative    HPV Genotype 18 Negative Negative    HPV Other High Risk Genotypes Negative Negative      Comment:      This test detects the high-risk HPV types 16, 18, 31, 33,  35, 39, 45, 51, 52, 56, 58, 59, 66, and 68. It is intended  for use in women 21 years and older with ASC-US cervical  cytology results and in women 30 years and older with  positive high-risk HPV results. Sensitivity may be affected  by specimen collection methods, stage of infection, and the  presence of interfering substances. Results should be  interpreted in conjunction with other available laboratory  and clinical data.      Narrative    Clinical Information:->01 Routine Check-up   PATHOLOGY GYN SPECIMEN    Narrative    [facility]    DEPARTMENT OF PATHOLOGY                   [address] [place] [address]  [address]              Phone [phone]          Fax [phone]   Edouard Hidalgo M.D.     Senia Packer M.D.     Adelaide Pittman M.D.   Senia Stanley M.D.     Braden Scott M.D.     Eduard Coronel M.D.  Patient:    TU DELUCA               Specimen    [id number]                                           #:      Copies to:                         CERVICOVAGINAL CYTOLOGY REPORT      INTERPRETATION:  NEGATIVE FOR INTRAEPITHELIAL LESION OR MALIGNANCY.    SPECIMEN ADEQUACY:  Satisfactory for Evaluation.  Endocervical " cells/transformation zone component present.  Scant cellularity.                                        Report electronically signed by:                                      PAUL VILLEDA(ASCP)  ------------------------------------------------------------------------    Z01.419 Z12.4 Z11.51  This liquid based ThinPrep Pap test is screened with the use of an  image guided system.  Cervicovaginal Papanicolaou (Pap) smears are  screening tests with a well-documented false-negative rate.  A single  negative test is significantly less accurate than multiple negative  tests in successive screening periods.                                   RELATED LABORATORY RESULTS      Ordered by: RAIZA Ord Date: 06/19/2018 Ord Time: 01:25  Test Name        Collected  Result          Abnor  Range     Units                    Date                      mal    Specimen Source  06/18/201  Cervical                   8    HPV Genotype 16  06/18/201  Negative               Negative                   8    HPV Genotype 18  06/18/201  Negative               Negative                   8    HPV Other High   06/18/201  Negative               Negative  Risk Genotypes   8        Report electronically signed by:  PAUL VILLEDA(ASCP)  Diagnosis performed at: [facility]  Pathology  Department, [address] [place] [address]  [id number]      Printed 00/00/0000 [id number] YOSI, TU   Page 1 of 1 MRN#:[id number]       If you have any questions or concerns, please don't hesitate to call.        Sincerely,      Neeru Ghosh M.D.    Electronically Signed

## 2020-01-08 ENCOUNTER — OFFICE VISIT (OUTPATIENT)
Dept: URGENT CARE | Facility: PHYSICIAN GROUP | Age: 35
End: 2020-01-08
Payer: COMMERCIAL

## 2020-01-08 VITALS
DIASTOLIC BLOOD PRESSURE: 70 MMHG | HEART RATE: 77 BPM | BODY MASS INDEX: 35.53 KG/M2 | SYSTOLIC BLOOD PRESSURE: 110 MMHG | RESPIRATION RATE: 16 BRPM | TEMPERATURE: 97.4 F | OXYGEN SATURATION: 98 % | WEIGHT: 207 LBS

## 2020-01-08 DIAGNOSIS — J22 LOWER RESP. TRACT INFECTION: ICD-10-CM

## 2020-01-08 PROCEDURE — 99214 OFFICE O/P EST MOD 30 MIN: CPT | Performed by: FAMILY MEDICINE

## 2020-01-08 RX ORDER — BENZONATATE 100 MG/1
100 CAPSULE ORAL 3 TIMES DAILY PRN
Qty: 30 CAP | Refills: 0 | Status: SHIPPED | OUTPATIENT
Start: 2020-01-08 | End: 2022-08-10

## 2020-01-08 ASSESSMENT — ENCOUNTER SYMPTOMS
COUGH: 1
VOMITING: 0
DIZZINESS: 0
CHILLS: 0
NAUSEA: 0
FEVER: 0
EYE PAIN: 0
SHORTNESS OF BREATH: 0
MYALGIAS: 0
SORE THROAT: 0

## 2020-01-08 NOTE — PROGRESS NOTES
Subjective:   Kristal Lopez is a 34 y.o. female who presents for Cough (2 weeks)        34-year-old female presents to the urgent care with chief complaint of a cough over the past 2 weeks.    Cough   This is a new problem. Episode onset: 2 weeks. The problem has been unchanged. The cough is non-productive (Minimal mucus). Pertinent negatives include no chest pain, chills, fever, myalgias, rash, sore throat or shortness of breath. Treatments tried: DayQuil, NyQuil, amoxicillin for 2 days from a prescription 6 months prior.     Review of Systems   Constitutional: Negative for chills and fever.   HENT: Negative for sore throat.    Eyes: Negative for pain.   Respiratory: Positive for cough. Negative for shortness of breath.    Cardiovascular: Negative for chest pain.   Gastrointestinal: Negative for nausea and vomiting.   Genitourinary: Negative for hematuria.   Musculoskeletal: Negative for myalgias.   Skin: Negative for rash.   Neurological: Negative for dizziness.   All other systems reviewed and are negative.    No Known Allergies   Objective:   /70 (BP Location: Right arm, Patient Position: Sitting, BP Cuff Size: Adult)   Pulse 77   Temp 36.3 °C (97.4 °F) (Temporal)   Resp 16   Wt 93.9 kg (207 lb)   SpO2 98%   BMI 35.53 kg/m²   Physical Exam  Vitals signs and nursing note reviewed.   Constitutional:       General: She is not in acute distress.     Appearance: She is well-developed.   HENT:      Head: Normocephalic and atraumatic.      Right Ear: Tympanic membrane and external ear normal.      Left Ear: Tympanic membrane and external ear normal.      Nose: Nose normal.      Mouth/Throat:      Mouth: Mucous membranes are moist.   Eyes:      Conjunctiva/sclera: Conjunctivae normal.      Pupils: Pupils are equal, round, and reactive to light.   Cardiovascular:      Rate and Rhythm: Normal rate and regular rhythm.      Heart sounds: No murmur.   Pulmonary:      Effort: Pulmonary effort is normal. No  respiratory distress.      Breath sounds: Normal breath sounds.   Abdominal:      General: There is no distension.      Palpations: Abdomen is soft.      Tenderness: There is no tenderness.   Musculoskeletal: Normal range of motion.   Skin:     General: Skin is warm and dry.   Neurological:      General: No focal deficit present.      Mental Status: She is alert and oriented to person, place, and time. Mental status is at baseline.      Gait: Gait (gait at baseline) normal.   Psychiatric:         Judgment: Judgment normal.           Assessment/Plan:   1. Lower resp. tract infection  - benzonatate (TESSALON) 100 MG Cap; Take 1 Cap by mouth 3 times a day as needed for Cough.  Dispense: 30 Cap; Refill: 0    Discussed close monitoring, return precautions, and supportive measures including maintaining adequate fluid hydration and caloric intake, relative rest and OTC symptom management including acetaminophen as needed for pain and/or fever.    Differential diagnosis, natural history, supportive care, and indications for immediate follow-up discussed.     Advised the patient to follow-up with the primary care physician for recheck, reevaluation, and consideration of further management.

## 2020-03-20 ENCOUNTER — APPOINTMENT (OUTPATIENT)
Dept: MEDICAL GROUP | Facility: MEDICAL CENTER | Age: 35
End: 2020-03-20
Payer: COMMERCIAL

## 2020-05-13 ENCOUNTER — APPOINTMENT (OUTPATIENT)
Dept: MEDICAL GROUP | Facility: MEDICAL CENTER | Age: 35
End: 2020-05-13
Payer: COMMERCIAL

## 2020-05-14 ENCOUNTER — APPOINTMENT (OUTPATIENT)
Dept: MEDICAL GROUP | Facility: MEDICAL CENTER | Age: 35
End: 2020-05-14
Payer: COMMERCIAL

## 2020-06-08 ENCOUNTER — TELEPHONE (OUTPATIENT)
Dept: SCHEDULING | Facility: IMAGING CENTER | Age: 35
End: 2020-06-08

## 2020-06-18 ENCOUNTER — TELEPHONE (OUTPATIENT)
Dept: MEDICAL GROUP | Facility: PHYSICIAN GROUP | Age: 35
End: 2020-06-18

## 2020-06-18 ENCOUNTER — APPOINTMENT (OUTPATIENT)
Dept: MEDICAL GROUP | Facility: LAB | Age: 35
End: 2020-06-18
Payer: COMMERCIAL

## 2020-12-01 ENCOUNTER — GYNECOLOGY VISIT (OUTPATIENT)
Dept: OBGYN | Facility: CLINIC | Age: 35
End: 2020-12-01
Payer: COMMERCIAL

## 2020-12-01 DIAGNOSIS — N93.9 ABNORMAL UTERINE BLEEDING (AUB): ICD-10-CM

## 2020-12-01 PROCEDURE — 99203 OFFICE O/P NEW LOW 30 MIN: CPT | Performed by: OBSTETRICS & GYNECOLOGY

## 2020-12-01 NOTE — NON-PROVIDER
Pt here for annual exam   100 2089  Pharmacy verified  Last pap 6/2020- neg   LMP 11/6/2020  Pt stated at last pap she was told she had a cervical cyst. NO U/S performed

## 2020-12-01 NOTE — PROGRESS NOTES
GYN Consult    CC/reason for consult: AUB    HPI: Kristal Lopez is a 35 y.o.  with AUB. She was on birth control for a few months and she stopped taking because it was causing nausea. She has had spotting in between her periods since 2020. She has gained weight - 20 lbs because she stopped going to the gym.       ROS:  constitutional: denies fevers, general concerns  CV: denies chest pain, palpitations, edema  Resp: denies shortness of breath, cough  GI: denies abd pain, N/V, diarrhea/constipation, blood in stool  : denies discharge, pain, denies urinary complaints  Neuro: denies hx of migraines w/ aura  Endo: denies significant weight changes, irregular menses, temperature intolerance, denies hotflashes/nightsweats  Heme/lymph: denies easy bleeding/bruising, denies swollen glands  Psych: denies concerns about mood, denies SI  Allergy: denies concerns        GYN History:  [id number]. Menarche @10.  Menses regular, lasting 5 days, not particularly heavy. When she has her spotting she bleeds for 1 day.  No h/o abnormal pap, nor history of cone biopsy, LEEP or any other cervical, uterine or gynecologic surgery. She was told she has a cyst on her cervix. No history of sexually transmitted diseases.       OB history:  OB History    Para Term  AB Living   4 2 2   2 2   SAB TAB Ectopic Molar Multiple Live Births   0 1       1      # Outcome Date GA Lbr Patrice/2nd Weight Sex Delivery Anes PTL Lv   4 Term 10/26/07 40w0d  3.742 kg (8 lb 4 oz) F Vag-Spont  N VINICIO   3 AB            2 Term            1 TAB                Past Medical History:   Diagnosis Date   • Depressed affect 2016   • Pregnancy        Past Surgical History:   Procedure Laterality Date   • OTHER      LASIK       Medications:   Current Outpatient Medications Ordered in Epic   Medication Sig Dispense Refill   • benzonatate (TESSALON) 100 MG Cap Take 1 Cap by mouth 3 times a day as needed for Cough. 30 Cap 0   • CONTRAVE 8-90 MG  TABLET SR 12 HR START 1TAB DAILY X1WEEK ,1TAB 2X DAILY WILL FOLLOW UP W/MD (Patient not taking: Reported on 1/8/2020) 60 Tab 0   • drospirenone-ethinyl estradiol (GLENDY) 3-0.02 MG per tablet Take 1 Tab by mouth every day. (Patient not taking: Reported on 1/8/2020) 28 Tab 11     No current Epic-ordered facility-administered medications on file.        Allergies: Patient has no known allergies.    Social History     Socioeconomic History   • Marital status:      Spouse name: Not on file   • Number of children: Not on file   • Years of education: Not on file   • Highest education level: Not on file   Occupational History   • Not on file   Social Needs   • Financial resource strain: Not on file   • Food insecurity     Worry: Not on file     Inability: Not on file   • Transportation needs     Medical: Not on file     Non-medical: Not on file   Tobacco Use   • Smoking status: Never Smoker   • Smokeless tobacco: Never Used   Substance and Sexual Activity   • Alcohol use: No   • Drug use: No   • Sexual activity: Yes     Partners: Male     Comment: student   Lifestyle   • Physical activity     Days per week: Not on file     Minutes per session: Not on file   • Stress: Not on file   Relationships   • Social connections     Talks on phone: Not on file     Gets together: Not on file     Attends Rastafari service: Not on file     Active member of club or organization: Not on file     Attends meetings of clubs or organizations: Not on file     Relationship status: Not on file   • Intimate partner violence     Fear of current or ex partner: Not on file     Emotionally abused: Not on file     Physically abused: Not on file     Forced sexual activity: Not on file   Other Topics Concern   • Not on file   Social History Narrative   • Not on file       Family History   Problem Relation Age of Onset   • Cancer Neg Hx    • Diabetes Neg Hx    • Heart Attack Neg Hx    • Hyperlipidemia Neg Hx      Denies hx of GI/GYN/breast  cancers    Physical Exam:  There were no vitals taken for this visit.  gen: AAO, NAD, affect appropriate  CV: RRR; no LE edema  Resp: Symmetric non labored breathing, CTAB  Abd: soft, NT, ND, no masses, no organomegaly, no hernias  : NEFG, normal urethral meatus, normal anus/perineum, normal vagina and cervix.  Atrophic cervix noted which had some nodularity on the anterior lip of the cervix.  Uterus midline, anteverted, no adnexal masses/tenderness  Skin: warm/dry, no lesions    A/P: 35 y.o.  with   1. Abnormal uterine bleeding (AUB)  US-PELVIC COMPLETE (TRANSABDOMINAL/TRANSVAGINAL) (COMBO)    TSH    FSH/LH    PROLACTIN    ESTROGEN TOTAL     Follow-up in 1 month to discuss ultrasound as well as lab work.

## 2020-12-30 ENCOUNTER — HOSPITAL ENCOUNTER (OUTPATIENT)
Dept: RADIOLOGY | Facility: MEDICAL CENTER | Age: 35
End: 2020-12-30
Attending: OBSTETRICS & GYNECOLOGY
Payer: COMMERCIAL

## 2020-12-30 DIAGNOSIS — N93.9 ABNORMAL UTERINE BLEEDING (AUB): ICD-10-CM

## 2020-12-30 PROCEDURE — 76830 TRANSVAGINAL US NON-OB: CPT

## 2021-01-02 ENCOUNTER — HOSPITAL ENCOUNTER (OUTPATIENT)
Dept: LAB | Facility: MEDICAL CENTER | Age: 36
End: 2021-01-02
Attending: OBSTETRICS & GYNECOLOGY
Payer: COMMERCIAL

## 2021-01-02 DIAGNOSIS — N93.9 ABNORMAL UTERINE BLEEDING (AUB): ICD-10-CM

## 2021-01-02 LAB
FSH SERPL-ACNC: 6.5 MIU/ML
LH SERPL-ACNC: 5.9 IU/L
PROLACTIN SERPL-MCNC: 20.3 NG/ML (ref 2.8–26)
TSH SERPL DL<=0.005 MIU/L-ACNC: 2.31 UIU/ML (ref 0.38–5.33)

## 2021-01-02 PROCEDURE — 83002 ASSAY OF GONADOTROPIN (LH): CPT

## 2021-01-02 PROCEDURE — 84443 ASSAY THYROID STIM HORMONE: CPT

## 2021-01-02 PROCEDURE — 83001 ASSAY OF GONADOTROPIN (FSH): CPT

## 2021-01-02 PROCEDURE — 84146 ASSAY OF PROLACTIN: CPT

## 2021-01-02 PROCEDURE — 82671 ASSAY OF ESTROGENS: CPT

## 2021-01-02 PROCEDURE — 36415 COLL VENOUS BLD VENIPUNCTURE: CPT

## 2021-01-05 ENCOUNTER — TELEPHONE (OUTPATIENT)
Dept: OBGYN | Facility: CLINIC | Age: 36
End: 2021-01-05

## 2021-01-05 NOTE — TELEPHONE ENCOUNTER
Pt would like to go over her labs and imaging that were recently done.    Dr. Villela has viewed them already.      Thanks

## 2021-01-05 NOTE — TELEPHONE ENCOUNTER
Consulted with Dr. Villela and stated to inform pt that US is consider to be normal, it can be consistent with PCOS but will discuss with pt on her f/u appt scheduled for 1/11/2021.     1/5/21 1508 Left message for pt to call back regarding US results.   1/6/2021 0940 pt called back and left message.   0955 called pt back and notified as above. explained Dr. Villela will be reviewing all of her results with her on her f/u appt on 1/11/2021. Pt agreed and verbalized understanding.

## 2021-01-06 LAB
ESTRADIOL SERPL HS-MCNC: 20.6 PG/ML
ESTROGEN SERPL CALC-MCNC: 54.5 PG/ML
ESTRONE SERPL-MCNC: 33.9 PG/ML

## 2021-01-11 ENCOUNTER — GYNECOLOGY VISIT (OUTPATIENT)
Dept: OBGYN | Facility: CLINIC | Age: 36
End: 2021-01-11
Payer: COMMERCIAL

## 2021-01-11 VITALS — BODY MASS INDEX: 35.89 KG/M2 | SYSTOLIC BLOOD PRESSURE: 134 MMHG | DIASTOLIC BLOOD PRESSURE: 52 MMHG | WEIGHT: 209.1 LBS

## 2021-01-11 DIAGNOSIS — K64.9 HEMORRHOIDS, UNSPECIFIED HEMORRHOID TYPE: ICD-10-CM

## 2021-01-11 DIAGNOSIS — N83.202 LEFT OVARIAN CYST: ICD-10-CM

## 2021-01-11 PROCEDURE — 99214 OFFICE O/P EST MOD 30 MIN: CPT | Performed by: OBSTETRICS & GYNECOLOGY

## 2021-01-11 NOTE — PROGRESS NOTES
35 y.o.  female previously seen for : Chief Complaint:  Follow up for spotting in between menses    Specialty Problems     None      . Patient now here in follow up. US demonstrated multiple follicles on the left ovary.  Lab work was normal.  Patient states that she is having regular cycles however she does have some spotting in between.  She does admit to a recent weight gain.  She relates this to working out less.  Denies any shortness of breath, chest pain, fevers, chills, nausea, vomiting, diarrhea, constipation.  Denies any abnormal hair growth or acne.    No LMP recorded.      Ros x 10 neg with pertinent positives listed in HPI above.          Current Outpatient Medications:   •  benzonatate (TESSALON) 100 MG Cap, Take 1 Cap by mouth 3 times a day as needed for Cough., Disp: 30 Cap, Rfl: 0  •  CONTRAVE 8-90 MG TABLET SR 12 HR, START 1TAB DAILY X1WEEK ,1TAB 2X DAILY WILL FOLLOW UP W/MD (Patient not taking: Reported on 2020), Disp: 60 Tab, Rfl: 0  •  drospirenone-ethinyl estradiol (GLENDY) 3-0.02 MG per tablet, Take 1 Tab by mouth every day. (Patient not taking: Reported on 2020), Disp: 28 Tab, Rfl: 11  ROS: no change in ROS since visit of : 2021.  :  Recent Results (from the past 336 hour(s))   PROLACTIN    Collection Time: 21 11:14 AM   Result Value Ref Range    Prolactin 20.30 2.80 - 26.00 ng/mL   FSH/LH    Collection Time: 21 11:14 AM   Result Value Ref Range    Luteinizing Hormone 5.9 IU/L    Follicle Stimulating Hormone 6.5 mIU/mL   TSH    Collection Time: 21 11:14 AM   Result Value Ref Range    TSH 2.310 0.380 - 5.330 uIU/mL   ESTROGENS FRACTIONATED    Collection Time: 21 11:14 AM   Result Value Ref Range    Estradiol-E2 20.6 pg/mL    Estrone Serum 33.9 pg/mL    Total Estrogen 54.5 pg/mL       Vitals:    21 1042   BP: 134/52   BP Location: Right arm   Patient Position: Sitting   BP Cuff Size: Adult   Weight: 94.8 kg (209 lb 1.6 oz)     Past Medical History:    Diagnosis Date   • Depressed affect 1/26/2016   • Pregnancy      PGYN: None  Social History     Socioeconomic History   • Marital status:      Spouse name: Not on file   • Number of children: Not on file   • Years of education: Not on file   • Highest education level: Not on file   Occupational History   • Not on file   Social Needs   • Financial resource strain: Not on file   • Food insecurity     Worry: Not on file     Inability: Not on file   • Transportation needs     Medical: Not on file     Non-medical: Not on file   Tobacco Use   • Smoking status: Never Smoker   • Smokeless tobacco: Never Used   Substance and Sexual Activity   • Alcohol use: No   • Drug use: No   • Sexual activity: Yes     Partners: Male     Comment: student   Lifestyle   • Physical activity     Days per week: Not on file     Minutes per session: Not on file   • Stress: Not on file   Relationships   • Social connections     Talks on phone: Not on file     Gets together: Not on file     Attends [employer] service: Not on file     Active member of club or organization: Not on file     Attends meetings of clubs or organizations: Not on file     Relationship status: Not on file   • Intimate partner violence     Fear of current or ex partner: Not on file     Emotionally abused: Not on file     Physically abused: Not on file     Forced sexual activity: Not on file   Other Topics Concern   • Not on file   Social History Narrative   • Not on file     Family History   Problem Relation Age of Onset   • Cancer Neg Hx    • Diabetes Neg Hx    • Heart Attack Neg Hx    • Hyperlipidemia Neg Hx      Past Surgical History:   Procedure Laterality Date   • OTHER      LASIK         Exam:   Gen: A&O x 3, NAD  CV: RRR, no clubbing cyanosis or edema  Resp: Normal respiratory effort, clear to auscultation bilaterally  Skin: warm and dry  Psych: appropriate mood and affect  Pelvic: urethral meatus normal, no bladder tenderness, vulva normal no lesions, vaginal  mucosa pink and moist, cervix normal no lesions, uterus midline and anteverted and nontender, bilateral adnexa nontender no masses palpated    Ass:   1. Intermenstrual spotting-discussed that although her labs are normal and she does not have any abnormal hair growth or acne, she does have cysts noted on the left ovary did appear similar to polycystic ovarian syndrome.  Patient does admit to recent weight gain secondary to sedentary lifestyle.  We discussed that she will start exercising more routinely and we will repeat imaging of her ovaries in February to see if appearance has changed.  She has expressed desire to conceive and is wondering if we can help with conception.  I explained to her that given the multiple follicles noted on her ovary I would not recommend ovulation induction as she is at risk for twins.  Recommended ovulation predictor test.  Having sex within the 72-hour window with a positive ovulation predictor test.    2. Hemorrhoids- wants new script for hydrocortisone cream    Spent 30 minutes minutes with the patient ; Face to Face, with >50% of this time spent in counseling and coordination of care, surrounding the above mentioned issues.      P.     Follow up : to discuss US results to see if ovaries still appear PCOS like.

## 2021-01-11 NOTE — NON-PROVIDER
Pt here for Gyn/Follow-up visit  Good Phone#: [phone]  Pharmacy verified.  Pt states would like to discuss US results on 12/30/2020  Pt states no other complaints for today.

## 2021-03-10 ENCOUNTER — APPOINTMENT (OUTPATIENT)
Dept: RADIOLOGY | Facility: MEDICAL CENTER | Age: 36
End: 2021-03-10
Attending: OBSTETRICS & GYNECOLOGY
Payer: COMMERCIAL

## 2021-04-22 ENCOUNTER — HOSPITAL ENCOUNTER (OUTPATIENT)
Dept: RADIOLOGY | Facility: MEDICAL CENTER | Age: 36
End: 2021-04-22
Attending: OBSTETRICS & GYNECOLOGY
Payer: COMMERCIAL

## 2021-04-22 DIAGNOSIS — N83.202 LEFT OVARIAN CYST: ICD-10-CM

## 2021-04-22 PROCEDURE — 76856 US EXAM PELVIC COMPLETE: CPT

## 2021-12-27 ENCOUNTER — APPOINTMENT (OUTPATIENT)
Dept: OBGYN | Facility: CLINIC | Age: 36
End: 2021-12-27
Payer: COMMERCIAL

## 2022-04-12 ENCOUNTER — HOSPITAL ENCOUNTER (OUTPATIENT)
Facility: MEDICAL CENTER | Age: 37
End: 2022-04-12
Attending: OBSTETRICS & GYNECOLOGY
Payer: COMMERCIAL

## 2022-04-12 ENCOUNTER — GYNECOLOGY VISIT (OUTPATIENT)
Dept: OBGYN | Facility: CLINIC | Age: 37
End: 2022-04-12
Payer: COMMERCIAL

## 2022-04-12 VITALS — DIASTOLIC BLOOD PRESSURE: 65 MMHG | WEIGHT: 208.8 LBS | SYSTOLIC BLOOD PRESSURE: 131 MMHG | BODY MASS INDEX: 35.84 KG/M2

## 2022-04-12 DIAGNOSIS — Z01.419 ENCOUNTER FOR ANNUAL ROUTINE GYNECOLOGICAL EXAMINATION: ICD-10-CM

## 2022-04-12 DIAGNOSIS — E28.2 PCOS (POLYCYSTIC OVARIAN SYNDROME): ICD-10-CM

## 2022-04-12 PROCEDURE — 99395 PREV VISIT EST AGE 18-39: CPT | Performed by: OBSTETRICS & GYNECOLOGY

## 2022-04-12 PROCEDURE — 87624 HPV HI-RISK TYP POOLED RSLT: CPT

## 2022-04-12 PROCEDURE — 88175 CYTOPATH C/V AUTO FLUID REDO: CPT

## 2022-04-12 NOTE — PROGRESS NOTES
Well woman aliza Lopez is a 37 y.o.  who presents to [facility] care for her Annual Exam. She denies complaints today. She is very insistent that she wants medication to help her ovulate because she wants to conceive. She is currently taking Vitex and Myoinositol for her PCOS. Her periods have become more regular.     GYN History:  Menses: coming every month, she is ovulating  Last pap: 2018  Sexually active: yes  History of STDs: no  Fam Hx of breast, ovarian, or colon cancer: no     OB History:        Health Maintenance:  Last mammogram: n/a  Last colorectal screening: n/a  Immunizations: UTD    Review of Systems:   ROS:  Constitutional: No fever, weight loss, weight gain, or fatigue  Skin/breast: No breast lump, nipple discharge, acne  Cardiovascular: No chest pain, leg swelling, palpitations  Respiratory: No shortness of breath, wheezing, or cough  Genitourinary: No pelvic pain, vaginal discharge, painful urination, abnormal periods, involuntary loss of urine, or vaginal bulge.  GI: No diarrhea, constipation, blood in stool, vomiting, abdominal pain  Endocrine: No hot flashes, abnormal thirst  Psychiatric: No depression, anxiety, or thoughts of self-harm  Neurologic: No headaches or seizures  Heme/lymph: No enlarged lymph nodes, denies bruising/bleeding that will not stop  Allergic: Denies allergies  MSK: Denies muscle weakness    All PMH, PSH, allergies, social history and FH reviewed and updated today:  Past Medical History:  Past Medical History:   Diagnosis Date   • Depressed affect 2016   • Pregnancy        Past Surgical History:  Past Surgical History:   Procedure Laterality Date   • OTHER      LASIK       Medications:   Current Outpatient Medications Ordered in Epic   Medication Sig Dispense Refill   • hydrocortisone 2.5 % Cream topical cream Apply dime size amount topically to affected area 20 g 1   • benzonatate (TESSALON) 100 MG Cap Take 1 Cap by mouth 3 times a day as  needed for Cough. 30 Cap 0   • CONTRAVE 8-90 MG TABLET SR 12 HR START 1TAB DAILY X1WEEK ,1TAB 2X DAILY WILL FOLLOW UP W/MD (Patient not taking: Reported on 1/8/2020) 60 Tab 0   • drospirenone-ethinyl estradiol (GLENDY) 3-0.02 MG per tablet Take 1 Tab by mouth every day. (Patient not taking: Reported on 1/8/2020) 28 Tab 11     No current [facility]-ordered facility-administered medications on file.       Allergies: Patient has no known allergies.    Social History:  Social History     Socioeconomic History   • Marital status:    Tobacco Use   • Smoking status: Never Smoker   • Smokeless tobacco: Never Used   Substance and Sexual Activity   • Alcohol use: No   • Drug use: No   • Sexual activity: Yes     Partners: Male     Comment: student       Family History:  Family History   Problem Relation Age of Onset   • Cancer Neg Hx    • Diabetes Neg Hx    • Heart Attack Neg Hx    • Hyperlipidemia Neg Hx            Objective:   Vitals:  /65 (BP Location: Right arm, Patient Position: Sitting, BP Cuff Size: Adult)   Wt 94.7 kg (208 lb 12.8 oz)   Body mass index is 35.84 kg/m². (Goal BM I>18 <25)    Physical Exam:   Nursing note and vitals reviewed.  Physical Exam   Constitutional: She is oriented to person, place, and time and well-developed, well-nourished, and in no distress.   HENT:   Head: Normocephalic and atraumatic.   Right Ear: External ear normal.   Eyes: Pupils are equal, round, and reactive to light. Conjunctivae are normal.   Neck: No thyromegaly present.   Cardiovascular: Intact distal pulses.   Pulmonary/Chest: Effort normal and breath sounds normal.   Abdominal: Soft. Bowel sounds are normal.   Musculoskeletal:         General: Normal range of motion.      Cervical back: Normal range of motion and neck supple.   Neurological: She is alert and oriented to person, place, and time. Gait normal.   Skin: Skin is warm and dry.   Psychiatric: Mood, memory, affect and judgment normal.   Genitourinary:  Normal  appearing external female genitalia without any rashes, lesions, labial fusion or tenderness.  Vagina is pink moist and well rugated. Physiologic discharge present within vaginal vault.  Cervix well visualized without masses or lesions.  On bimanual exam there is no cervical motion tenderness, uterus is midline, not enlarged, fixed, or tender.  No adnexal masses or tenderness.   Breast: No masses, skin dimpling, or lymphadenopathy noted bilaterally.  No nipple discharge.  Nipples everted.      Assessment/Plan:     1. Encounter for annual routine gynecological examination         Kristal Lopez is a 37 y.o.  female who presents for her annual gynecologic exam.   # Preventative health care:   --Breast self awareness discussed. Mammogram discussed (annually starting at age 40).  --Cervical cancer screening. Last Pap 2018. Collected today. HPV sent. I will follow up with patient once results are back as per ASCCP guidelines.   --Smoking -not a smoker.   --Colorectal screening not  indicated.   --Immunizations are up to date.  --Diet, exercise, vitamin supplementation, and hydration discussed.  # Contraception: desires fertility  # STD screening: not requested  # PCOS- ovulating with vitex and kike-inositol. Discussed cessation of both if she becomes pregnant. Discussed that I would not give Femara because she is now ovulating. Infertility referral placed as patient does not seem to be acknowledging the risk of AMA and obesity with pregnancy during the time that I have to  her today.   # Follow up annually or prn.

## 2022-04-13 DIAGNOSIS — Z01.419 ENCOUNTER FOR ANNUAL ROUTINE GYNECOLOGICAL EXAMINATION: ICD-10-CM

## 2022-05-05 DIAGNOSIS — K64.9 HEMORRHOIDS, UNSPECIFIED HEMORRHOID TYPE: ICD-10-CM

## 2022-05-12 DIAGNOSIS — K64.9 HEMORRHOIDS, UNSPECIFIED HEMORRHOID TYPE: ICD-10-CM

## 2022-06-28 ENCOUNTER — PATIENT MESSAGE (OUTPATIENT)
Dept: OBGYN | Facility: CLINIC | Age: 37
End: 2022-06-28
Payer: COMMERCIAL

## 2022-06-28 DIAGNOSIS — R30.0 DYSURIA: ICD-10-CM

## 2022-06-28 RX ORDER — NITROFURANTOIN 25; 75 MG/1; MG/1
100 CAPSULE ORAL 2 TIMES DAILY
Qty: 14 CAPSULE | Refills: 0 | Status: SHIPPED | OUTPATIENT
Start: 2022-06-28 | End: 2022-08-10

## 2022-06-29 ENCOUNTER — APPOINTMENT (OUTPATIENT)
Dept: OBGYN | Facility: CLINIC | Age: 37
End: 2022-06-29
Payer: COMMERCIAL

## 2022-07-23 ENCOUNTER — TELEPHONE (OUTPATIENT)
Dept: SCHEDULING | Facility: IMAGING CENTER | Age: 37
End: 2022-07-23
Payer: COMMERCIAL

## 2022-08-09 ENCOUNTER — OFFICE VISIT (OUTPATIENT)
Dept: MEDICAL GROUP | Facility: PHYSICIAN GROUP | Age: 37
End: 2022-08-09
Payer: COMMERCIAL

## 2022-08-09 VITALS
BODY MASS INDEX: 37.22 KG/M2 | HEIGHT: 64 IN | WEIGHT: 218 LBS | TEMPERATURE: 97.6 F | OXYGEN SATURATION: 100 % | DIASTOLIC BLOOD PRESSURE: 66 MMHG | HEART RATE: 71 BPM | SYSTOLIC BLOOD PRESSURE: 106 MMHG

## 2022-08-09 DIAGNOSIS — F43.21 SITUATIONAL DEPRESSION: ICD-10-CM

## 2022-08-09 DIAGNOSIS — E66.9 OBESITY (BMI 30-39.9): ICD-10-CM

## 2022-08-09 DIAGNOSIS — Z86.32 HISTORY OF GESTATIONAL DIABETES: ICD-10-CM

## 2022-08-09 DIAGNOSIS — E28.2 PCOS (POLYCYSTIC OVARIAN SYNDROME): ICD-10-CM

## 2022-08-09 DIAGNOSIS — Z13.228 SCREENING FOR METABOLIC DISORDER: ICD-10-CM

## 2022-08-09 PROCEDURE — 99214 OFFICE O/P EST MOD 30 MIN: CPT | Performed by: NURSE PRACTITIONER

## 2022-08-09 ASSESSMENT — PATIENT HEALTH QUESTIONNAIRE - PHQ9: CLINICAL INTERPRETATION OF PHQ2 SCORE: 0
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
## 2022-08-10 PROBLEM — E28.2 PCOS (POLYCYSTIC OVARIAN SYNDROME): Status: ACTIVE | Noted: 2022-08-10

## 2022-08-10 ASSESSMENT — ENCOUNTER SYMPTOMS
COUGH: 0
MUSCULOSKELETAL NEGATIVE: 1
NEUROLOGICAL NEGATIVE: 1
CONSTITUTIONAL NEGATIVE: 1
PALPITATIONS: 0
FEVER: 0
GASTROINTESTINAL NEGATIVE: 1
SHORTNESS OF BREATH: 0
SPUTUM PRODUCTION: 0
PSYCHIATRIC NEGATIVE: 1
EYES NEGATIVE: 1

## 2022-09-21 ENCOUNTER — HOSPITAL ENCOUNTER (OUTPATIENT)
Dept: LAB | Facility: MEDICAL CENTER | Age: 37
End: 2022-09-21
Attending: NURSE PRACTITIONER
Payer: COMMERCIAL

## 2022-09-21 DIAGNOSIS — E66.9 OBESITY (BMI 30-39.9): ICD-10-CM

## 2022-09-21 DIAGNOSIS — F43.21 SITUATIONAL DEPRESSION: ICD-10-CM

## 2022-09-21 DIAGNOSIS — Z13.228 SCREENING FOR METABOLIC DISORDER: ICD-10-CM

## 2022-09-21 DIAGNOSIS — Z86.32 HISTORY OF GESTATIONAL DIABETES: ICD-10-CM

## 2022-09-21 DIAGNOSIS — E28.2 PCOS (POLYCYSTIC OVARIAN SYNDROME): ICD-10-CM

## 2022-09-21 LAB
ALBUMIN SERPL BCP-MCNC: 4 G/DL (ref 3.2–4.9)
ALBUMIN/GLOB SERPL: 1.3 G/DL
ALP SERPL-CCNC: 103 U/L (ref 30–99)
ALT SERPL-CCNC: 15 U/L (ref 2–50)
ANION GAP SERPL CALC-SCNC: 11 MMOL/L (ref 7–16)
AST SERPL-CCNC: 17 U/L (ref 12–45)
BASOPHILS # BLD AUTO: 0.6 % (ref 0–1.8)
BASOPHILS # BLD: 0.04 K/UL (ref 0–0.12)
BILIRUB SERPL-MCNC: 0.3 MG/DL (ref 0.1–1.5)
BUN SERPL-MCNC: 12 MG/DL (ref 8–22)
CALCIUM SERPL-MCNC: 8.8 MG/DL (ref 8.5–10.5)
CHLORIDE SERPL-SCNC: 105 MMOL/L (ref 96–112)
CHOLEST SERPL-MCNC: 157 MG/DL (ref 100–199)
CO2 SERPL-SCNC: 22 MMOL/L (ref 20–33)
CREAT SERPL-MCNC: 0.66 MG/DL (ref 0.5–1.4)
EOSINOPHIL # BLD AUTO: 0.11 K/UL (ref 0–0.51)
EOSINOPHIL NFR BLD: 1.6 % (ref 0–6.9)
ERYTHROCYTE [DISTWIDTH] IN BLOOD BY AUTOMATED COUNT: 39.8 FL (ref 35.9–50)
EST. AVERAGE GLUCOSE BLD GHB EST-MCNC: 120 MG/DL
FASTING STATUS PATIENT QL REPORTED: NORMAL
GFR SERPLBLD CREATININE-BSD FMLA CKD-EPI: 114 ML/MIN/1.73 M 2
GLOBULIN SER CALC-MCNC: 3 G/DL (ref 1.9–3.5)
GLUCOSE SERPL-MCNC: 98 MG/DL (ref 65–99)
HBA1C MFR BLD: 5.8 % (ref 4–5.6)
HCT VFR BLD AUTO: 40 % (ref 37–47)
HDLC SERPL-MCNC: 51 MG/DL
HGB BLD-MCNC: 13.3 G/DL (ref 12–16)
IMM GRANULOCYTES # BLD AUTO: 0.02 K/UL (ref 0–0.11)
IMM GRANULOCYTES NFR BLD AUTO: 0.3 % (ref 0–0.9)
LDLC SERPL CALC-MCNC: 90 MG/DL
LYMPHOCYTES # BLD AUTO: 2.15 K/UL (ref 1–4.8)
LYMPHOCYTES NFR BLD: 31.1 % (ref 22–41)
MCH RBC QN AUTO: 27.5 PG (ref 27–33)
MCHC RBC AUTO-ENTMCNC: 33.3 G/DL (ref 33.6–35)
MCV RBC AUTO: 82.6 FL (ref 81.4–97.8)
MONOCYTES # BLD AUTO: 0.28 K/UL (ref 0–0.85)
MONOCYTES NFR BLD AUTO: 4.1 % (ref 0–13.4)
NEUTROPHILS # BLD AUTO: 4.31 K/UL (ref 2–7.15)
NEUTROPHILS NFR BLD: 62.3 % (ref 44–72)
NRBC # BLD AUTO: 0 K/UL
NRBC BLD-RTO: 0 /100 WBC
PLATELET # BLD AUTO: 222 K/UL (ref 164–446)
PMV BLD AUTO: 12.5 FL (ref 9–12.9)
POTASSIUM SERPL-SCNC: 4.3 MMOL/L (ref 3.6–5.5)
PROT SERPL-MCNC: 7 G/DL (ref 6–8.2)
RBC # BLD AUTO: 4.84 M/UL (ref 4.2–5.4)
SODIUM SERPL-SCNC: 138 MMOL/L (ref 135–145)
TRIGL SERPL-MCNC: 79 MG/DL (ref 0–149)
TSH SERPL DL<=0.005 MIU/L-ACNC: 2.46 UIU/ML (ref 0.38–5.33)
WBC # BLD AUTO: 6.9 K/UL (ref 4.8–10.8)

## 2022-09-21 PROCEDURE — 80061 LIPID PANEL: CPT

## 2022-09-21 PROCEDURE — 80053 COMPREHEN METABOLIC PANEL: CPT

## 2022-09-21 PROCEDURE — 36415 COLL VENOUS BLD VENIPUNCTURE: CPT

## 2022-09-21 PROCEDURE — 82306 VITAMIN D 25 HYDROXY: CPT

## 2022-09-21 PROCEDURE — 84443 ASSAY THYROID STIM HORMONE: CPT

## 2022-09-21 PROCEDURE — 85025 COMPLETE CBC W/AUTO DIFF WBC: CPT

## 2022-09-21 PROCEDURE — 83036 HEMOGLOBIN GLYCOSYLATED A1C: CPT

## 2022-09-22 LAB — 25(OH)D3 SERPL-MCNC: 29 NG/ML (ref 30–100)

## 2022-11-15 ENCOUNTER — HOSPITAL ENCOUNTER (OUTPATIENT)
Facility: MEDICAL CENTER | Age: 37
End: 2022-11-15
Attending: NURSE PRACTITIONER
Payer: COMMERCIAL

## 2022-11-15 ENCOUNTER — OFFICE VISIT (OUTPATIENT)
Dept: MEDICAL GROUP | Facility: PHYSICIAN GROUP | Age: 37
End: 2022-11-15
Payer: COMMERCIAL

## 2022-11-15 VITALS
RESPIRATION RATE: 16 BRPM | TEMPERATURE: 97.9 F | DIASTOLIC BLOOD PRESSURE: 80 MMHG | HEIGHT: 64 IN | BODY MASS INDEX: 36.19 KG/M2 | SYSTOLIC BLOOD PRESSURE: 112 MMHG | OXYGEN SATURATION: 98 % | HEART RATE: 80 BPM | WEIGHT: 212 LBS

## 2022-11-15 DIAGNOSIS — E66.9 OBESITY (BMI 30-39.9): ICD-10-CM

## 2022-11-15 DIAGNOSIS — Z76.89 ENCOUNTER FOR WEIGHT MANAGEMENT: ICD-10-CM

## 2022-11-15 PROBLEM — Z86.16 HISTORY OF COVID-19: Status: ACTIVE | Noted: 2022-11-15

## 2022-11-15 PROCEDURE — 99213 OFFICE O/P EST LOW 20 MIN: CPT | Performed by: NURSE PRACTITIONER

## 2022-11-15 PROCEDURE — G0481 DRUG TEST DEF 8-14 CLASSES: HCPCS

## 2022-11-15 RX ORDER — PHENTERMINE HYDROCHLORIDE 37.5 MG/1
37.5 TABLET ORAL
Qty: 30 TABLET | Refills: 2 | Status: SHIPPED | OUTPATIENT
Start: 2022-11-15 | End: 2023-02-13

## 2022-11-15 ASSESSMENT — ENCOUNTER SYMPTOMS
PALPITATIONS: 0
GASTROINTESTINAL NEGATIVE: 1
FEVER: 0
COUGH: 0
NEUROLOGICAL NEGATIVE: 1
CONSTITUTIONAL NEGATIVE: 1
SPUTUM PRODUCTION: 0
SHORTNESS OF BREATH: 0
PSYCHIATRIC NEGATIVE: 1

## 2022-11-15 ASSESSMENT — FIBROSIS 4 INDEX: FIB4 SCORE: 0.77

## 2022-11-15 NOTE — PROGRESS NOTES
Subjective:     CC:   Chief Complaint   Patient presents with    Weight Loss        HPI:   Patient is a 37 y.o. established female patient with medical history listed below here today for weight loss management. Also has form for missed work when she had COVID.    Problem   Encounter for Weight Management    Struggling with weight for some time, current BMI 36.39; weight at 212 lbs. We had discussed Metformin vs phentermine for weight management. She has a distant hx of PCOS. She has used phentermine in the past & had success with getting active in the gym & losing weight. She would like to try phentermine for about a month to get back to gym exercise.  Discussed weight loss program with Dr Connors at our last visit. She is not interested in this right now.     History of Covid-19    Reports having home positive COVID test on 10/1/2022. Was quarantined at home with symptoms of congestion, fever, body aches. States she was able to go back to work 10/14/2022.  - Temporary FMLA form reviewed and completed per pt request today     Pcos (Polycystic Ovarian Syndrome)    Diagnosed per GYN, multiple cysts on left ovary; patient is currently on daily kike-inositol which she states is helping with her vaginal dryness. She is not on birth control, she would like to get pregnant for a 3rd child. She is struggling to lose weight and inquired about phentermine today. I have explained that there is risk for fetal harm with this medication and if she is actively trying to get pregnant weight loss medication/stimulants are not recommended. I think she understands this. I briefly talked to her about metformin, we will check her labs and if A1c is elevated we plan to start her on this. Encouraged her to eat healthy fresh foods, stay active with her gym, walking  ----  A1c at 5.8; she declined metformin for now. She would like phentermine for weight loss     Obesity (Bmi 30-39.9)    States struggling to lose weight. She goes to gym  "and also walks. Has days when she is 'down' and over eats. She would like to lose weight before getting pregnant. States GYN mentioned her having PCOS.  Asked about phentermine, she used in the past & it gave her motivation to stay active consistently. She understands cannot be pregnant while on phentermine. We recently checked her labs & A1C is at 5.8, I recommend metformin for her PCOS which may help with weight loss. She would like to use phentermine instead to get more active in the gym            Patient Active Problem List   Diagnosis    History of gestational diabetes    Encounter for initial prescription of vaginal ring hormonal contraceptive    Obesity (BMI 30-39.9)    Situational depression    PCOS (polycystic ovarian syndrome)    Encounter for weight management    History of COVID-19       Past Medical History:   Diagnosis Date    Depressed affect 1/26/2016    Pregnancy         Past Surgical History:   Procedure Laterality Date    OTHER      LASIK        No current outpatient medications on file prior to visit.     No current facility-administered medications on file prior to visit.        Health Maintenance: Completed    ROS:  Review of Systems   Constitutional: Negative.  Negative for fever and malaise/fatigue.   Respiratory:  Negative for cough, sputum production and shortness of breath.    Cardiovascular:  Negative for chest pain, palpitations and leg swelling.   Gastrointestinal: Negative.    Genitourinary: Negative.    Neurological: Negative.    Endo/Heme/Allergies: Negative.    Psychiatric/Behavioral: Negative.       Objective:     Exam:  /80   Pulse 80   Temp 36.6 °C (97.9 °F) (Tympanic)   Resp 16   Ht 1.626 m (5' 4\")   Wt 96.2 kg (212 lb)   SpO2 98%   BMI 36.39 kg/m²  Body mass index is 36.39 kg/m².    Physical Exam  Constitutional:       Appearance: Normal appearance.   Cardiovascular:      Rate and Rhythm: Normal rate and regular rhythm.      Pulses: Normal pulses.      Heart " sounds: Normal heart sounds.   Pulmonary:      Effort: Pulmonary effort is normal.      Breath sounds: Normal breath sounds.   Musculoskeletal:         General: Normal range of motion.      Cervical back: Normal range of motion and neck supple.      Right lower leg: No edema.      Left lower leg: No edema.   Skin:     General: Skin is warm and dry.   Neurological:      General: No focal deficit present.      Mental Status: She is alert and oriented to person, place, and time.   Psychiatric:         Mood and Affect: Mood normal.         Behavior: Behavior normal.         Thought Content: Thought content normal.         Judgment: Judgment normal.       Labs: reviewed with patient     Assessment & Plan:     37 y.o. female with the following -     Problem List Items Addressed This Visit       Obesity (BMI 30-39.9)     Weight in today at 212 lbs  CS agreement signed today; UDS collected today  She understands cannot get pregnant with stimulant and plans to either abstain or use condoms when active with .   - start phentermine 37.5mg 1 tab QD. She plans to take on days she will go to the gym. She is anticipating will only need for about a month to get momentum with gym  - Next weigh in at 3 months;          Relevant Medications    phentermine (ADIPEX-P) 37.5 MG tablet    Other Relevant Orders    Controlled Substance Treatment Agreement    PAIN MANAGEMENT [facility], [place]    Encounter for weight management     Weight in today at 212 lbs  CS agreement signed today; UDS collected today  She understands cannot get pregnant with stimulant and plans to either abstain or use condoms when active with .   - start phentermine 37.5mg 1 tab QD. She plans to take on days she will go to the gym. She is anticipating will only need for about a month to get momentum with gym  - Next weigh in at 3 months;          Relevant Medications    phentermine (ADIPEX-P) 37.5 MG tablet    Other Relevant Orders    Controlled Substance Treatment  Agreement    PAIN MANAGEMENT [facility], [place]       Medications Prescribed Today:  1. Obesity (BMI 30-39.9)  2. Encounter for weight management  - phentermine (ADIPEX-P) 37.5 MG tablet; Take 1 Tablet by mouth every morning before breakfast for 90 days.  Dispense: 30 Tablet; Refill: 2    Educated in proper administration of medication(s) ordered today including safety, possible SE, risks, benefits, rationale and alternatives to therapy.       Return in about 3 months (around 2/15/2023) for weight loss.    Please note that this dictation was created using voice recognition software. I have made every reasonable attempt to correct obvious errors, but I expect that there are errors of grammar and possibly content that I did not discover before finalizing the note.

## 2022-11-16 NOTE — ASSESSMENT & PLAN NOTE
Weight in today at 212 lbs  CS agreement signed today; UDS collected today  She understands cannot get pregnant with stimulant and plans to either abstain or use condoms when active with .   - start phentermine 37.5mg 1 tab QD. She plans to take on days she will go to the gym. She is anticipating will only need for about a month to get momentum with gym  - Next weigh in at 3 months;

## 2022-11-19 LAB
1OH-MIDAZOLAM UR QL SCN: NOT DETECTED
6MAM UR QL: NOT DETECTED
7AMINOCLONAZEPAM UR QL: NOT DETECTED
A-OH ALPRAZ UR QL: NOT DETECTED
ALPRAZ UR QL: NOT DETECTED
AMPHET UR QL SCN: NOT DETECTED
ANNOTATION COMMENT IMP: NORMAL
ANNOTATION COMMENT IMP: NORMAL
BARBITURATES UR QL: NOT DETECTED
BUPRENORPHINE UR QL: NOT DETECTED
BZE UR QL: NOT DETECTED
CARBOXYTHC UR QL: NOT DETECTED
CARISOPRODOL UR QL: NOT DETECTED
CLONAZEPAM UR QL: NOT DETECTED
CODEINE UR QL: NOT DETECTED
DIAZEPAM UR QL: NOT DETECTED
ETHYL GLUCURONIDE UR QL: NOT DETECTED
FENTANYL UR QL: NOT DETECTED
GABAPENTIN UR QL: NOT DETECTED
HYDROCODONE UR QL: NOT DETECTED
HYDROMORPHONE UR QL: NOT DETECTED
LORAZEPAM UR QL: NOT DETECTED
MDA UR QL: NOT DETECTED
MDEA UR QL: NOT DETECTED
MDMA UR QL: NOT DETECTED
MEPERIDINE UR QL: NOT DETECTED
METHADONE UR QL: NOT DETECTED
METHAMPHET UR QL: NOT DETECTED
MIDAZOLAM UR QL SCN: NOT DETECTED
MORPHINE UR QL: NOT DETECTED
NALOXONE UR QL SCN: NOT DETECTED
NORBUPRENORPHINE UR QL CFM: NOT DETECTED
NORDIAZEPAM UR QL: NOT DETECTED
NORFENTANYL UR QL: NOT DETECTED
NORHYDROCODONE UR QL CFM: NOT DETECTED
NOROXYCODONE UR QL CFM: NOT DETECTED
NOROXYMORPH CO100 Q0458: NOT DETECTED
OXAZEPAM UR QL: NOT DETECTED
OXYCODONE UR QL: NOT DETECTED
OXYMORPHONE UR QL: NOT DETECTED
PATHOLOGY STUDY: NORMAL
PCP UR QL: NOT DETECTED
PHENTERMINE UR QL: NOT DETECTED
PPAA UR QL: NOT DETECTED
PREGABALIN UR QL SCN: NOT DETECTED
SERVICE CMNT-IMP: NORMAL
TAPENADOL OSULF CO200 Q0473: NOT DETECTED
TAPENTADOL UR QL SCN: NOT DETECTED
TEMAZEPAM UR QL: NOT DETECTED
TRAMADOL UR QL: NOT DETECTED
ZOLPIDEM PHENYL-4-CARB UR QL SCN: NOT DETECTED
ZOLPIDEM UR QL: NOT DETECTED

## 2023-02-15 ENCOUNTER — OFFICE VISIT (OUTPATIENT)
Dept: MEDICAL GROUP | Facility: PHYSICIAN GROUP | Age: 38
End: 2023-02-15
Payer: COMMERCIAL

## 2023-02-15 VITALS
BODY MASS INDEX: 33.12 KG/M2 | WEIGHT: 194 LBS | HEART RATE: 102 BPM | DIASTOLIC BLOOD PRESSURE: 72 MMHG | HEIGHT: 64 IN | OXYGEN SATURATION: 97 % | TEMPERATURE: 98.2 F | SYSTOLIC BLOOD PRESSURE: 116 MMHG

## 2023-02-15 DIAGNOSIS — K64.9 HEMORRHOIDS, UNSPECIFIED HEMORRHOID TYPE: ICD-10-CM

## 2023-02-15 DIAGNOSIS — E66.9 OBESITY (BMI 30-39.9): ICD-10-CM

## 2023-02-15 DIAGNOSIS — Z76.89 ENCOUNTER FOR WEIGHT MANAGEMENT: ICD-10-CM

## 2023-02-15 DIAGNOSIS — K59.00 CONSTIPATION, UNSPECIFIED CONSTIPATION TYPE: ICD-10-CM

## 2023-02-15 PROCEDURE — 99213 OFFICE O/P EST LOW 20 MIN: CPT | Performed by: NURSE PRACTITIONER

## 2023-02-15 RX ORDER — PHENTERMINE HYDROCHLORIDE 37.5 MG/1
37.5 CAPSULE ORAL EVERY MORNING
Qty: 30 CAPSULE | Refills: 0 | Status: SHIPPED | OUTPATIENT
Start: 2023-03-17 | End: 2023-02-21

## 2023-02-15 RX ORDER — PHENTERMINE HYDROCHLORIDE 37.5 MG/1
37.5 CAPSULE ORAL EVERY MORNING
COMMUNITY
End: 2023-02-15 | Stop reason: SDUPTHER

## 2023-02-15 RX ORDER — BENZOCAINE/MENTHOL 6 MG-10 MG
1 LOZENGE MUCOUS MEMBRANE 2 TIMES DAILY
Qty: 45 G | Refills: 0 | Status: SHIPPED | OUTPATIENT
Start: 2023-02-15

## 2023-02-15 RX ORDER — DOCUSATE SODIUM 100 MG/1
100 CAPSULE, LIQUID FILLED ORAL
Qty: 60 CAPSULE | Refills: 0 | Status: SHIPPED | OUTPATIENT
Start: 2023-02-15

## 2023-02-15 RX ORDER — HYDROCORTISONE ACETATE 25 MG/1
25 SUPPOSITORY RECTAL EVERY 12 HOURS
Qty: 24 SUPPOSITORY | Refills: 0 | Status: SHIPPED | OUTPATIENT
Start: 2023-02-15 | End: 2023-02-27 | Stop reason: SDUPTHER

## 2023-02-15 RX ORDER — PHENTERMINE HYDROCHLORIDE 37.5 MG/1
37.5 CAPSULE ORAL EVERY MORNING
Qty: 30 CAPSULE | Refills: 0 | Status: SHIPPED | OUTPATIENT
Start: 2023-02-15 | End: 2023-03-17

## 2023-02-15 RX ORDER — PHENTERMINE HYDROCHLORIDE 37.5 MG/1
37.5 CAPSULE ORAL EVERY MORNING
Qty: 30 CAPSULE | Refills: 0 | Status: SHIPPED | OUTPATIENT
Start: 2023-04-16 | End: 2023-04-04

## 2023-02-15 RX ORDER — POLYETHYLENE GLYCOL 3350 17 G/17G
17 POWDER, FOR SOLUTION ORAL
Qty: 507 G | Refills: 0 | Status: SHIPPED | OUTPATIENT
Start: 2023-02-15

## 2023-02-15 ASSESSMENT — FIBROSIS 4 INDEX: FIB4 SCORE: 0.77

## 2023-02-15 NOTE — PROGRESS NOTES
Subjective       CC:   Chief Complaint   Patient presents with    Weight Loss     Follow up on weight loss.     Anal Itching     Pimple , constipated         HPI:   Patient is a 37 y.o. established female patient with medical history listed below here today for evaluation and management of weight loss; constipation with itchy hemorrhoid.     Problem   Encounter for Weight Management    HPI from 11/15/2022 visit: Struggling with weight for some time, current BMI 36.39; weight at 212 lbs. We had discussed Metformin vs phentermine for weight management. She has a distant hx of PCOS. She has used phentermine in the past & had success with getting active in the gym & losing weight. She would like to try phentermine for about a month to get back to gym exercise.  Discussed weight loss program with Dr Connors at our last visit. She is not interested in this right now.  -----  Started on Phentermine 37.5mg QD at last visit 11/15/2022  Would like to continue and here today for refill         Patient Active Problem List   Diagnosis    History of gestational diabetes    Encounter for initial prescription of vaginal ring hormonal contraceptive    Obesity (BMI 30-39.9)    Situational depression    PCOS (polycystic ovarian syndrome)    Encounter for weight management    History of COVID-19       Past Medical History:   Diagnosis Date    Depressed affect 1/26/2016    Pregnancy         Past Surgical History:   Procedure Laterality Date    OTHER      LASIK        No current outpatient medications on file prior to visit.     No current facility-administered medications on file prior to visit.        ROS:  Review of Systems   Constitutional: Negative.  Negative for fever and malaise/fatigue.   Respiratory:  Negative for cough, sputum production and shortness of breath.    Cardiovascular:  Negative for chest pain, palpitations and leg swelling.   Gastrointestinal:  Positive for constipation.        Hemorrhoid   Genitourinary:  "Negative.    Neurological: Negative.    Endo/Heme/Allergies: Negative.    Psychiatric/Behavioral: Negative.       Objective       Exam:  /72   Pulse (!) 102   Temp 36.8 °C (98.2 °F) (Tympanic)   Ht 1.626 m (5' 4\")   Wt 88 kg (194 lb)   SpO2 97%   BMI 33.30 kg/m²  Body mass index is 33.3 kg/m².    Physical Exam  Constitutional:       Appearance: Normal appearance.   Cardiovascular:      Rate and Rhythm: Normal rate and regular rhythm.      Pulses: Normal pulses.      Heart sounds: Normal heart sounds.   Pulmonary:      Effort: Pulmonary effort is normal.      Breath sounds: Normal breath sounds.   Abdominal:      General: Bowel sounds are normal. There is no distension.      Palpations: Abdomen is soft.      Tenderness: There is no abdominal tenderness.      Hernia: No hernia is present.   Genitourinary:         Comments: <5mm external reducible hemorrhoid tag noted. It does not appear inflamed. No drainage or redness noted. It is not painful with palpation  Musculoskeletal:         General: Normal range of motion.      Right lower leg: No edema.      Left lower leg: No edema.   Skin:     General: Skin is warm and dry.   Neurological:      General: No focal deficit present.      Mental Status: She is alert and oriented to person, place, and time.   Psychiatric:         Mood and Affect: Mood normal.         Behavior: Behavior normal.         Thought Content: Thought content normal.         Judgment: Judgment normal.       Assessment & Plan       37 y.o. female with the following -     Problem List Items Addressed This Visit       Obesity (BMI 30-39.9)    Relevant Medications    phentermine 37.5 MG capsule    phentermine 37.5 MG capsule (Start on 3/17/2023)    phentermine 37.5 MG capsule (Start on 4/16/2023)    Other Relevant Orders    PAIN MANAGEMENT [facility]    Encounter for weight management     Starting Weight - 212 lbs  Weight in today at 194 lbs (- 18 lbs)    She would like to continue with " phentermine; she is attending gym daily and has good appetite control with this. She understands this is a stimulant with dependency risks. She understands to avoid pregnancy with stimulant due to fetal health risks and plans to either abstain or use condoms when sexually active with .   Having constipation since starting phentermine which is aggravating her hemorrhoid. She is on nightly fiber and staying hydrated. She will add miralax + stool softner prn. Sent in prescription for anusol supp & steroid cream to help with discomfort.     PDMP reviewed, appropriate; last refilled phentermine 37.5mg #30 on 12/22/2022  CS agreement signed 11/15/2022; UDS collected today for med refill  She understands cannot get pregnant with stimulant and plans to either abstain or use condoms when active with .   - continue phentermine 37.5mg 1 tab QD. She plans to take on days she will go to the gym. She is anticipating will only need for about 3 more months to maintain momentum with gym; refilled for 3 months  - Next weigh in at 3 months;          Relevant Medications    phentermine 37.5 MG capsule    phentermine 37.5 MG capsule (Start on 3/17/2023)    phentermine 37.5 MG capsule (Start on 4/16/2023)    Other Relevant Orders    PAIN MANAGEMENT SCRN, UR     Other Visit Diagnoses       Hemorrhoids, unspecified hemorrhoid type        Relevant Medications    docusate sodium (COLACE) 100 MG Cap    hydrocortisone (ANUSOL-HC) 25 MG Suppos    hydrocortisone 1 % Cream    polyethylene glycol 3350 (MIRALAX) 17 GM/SCOOP Powder    Constipation, unspecified constipation type        Relevant Medications    docusate sodium (COLACE) 100 MG Cap    polyethylene glycol 3350 (MIRALAX) 17 GM/SCOOP Powder            Medications Prescribed Today:  1. Encounter for weight management  - phentermine 37.5 MG capsule; Take 1 Capsule by mouth every morning for 30 days.  Dispense: 30 Capsule; Refill: 0  - phentermine 37.5 MG capsule; Take 1 Capsule  by mouth every morning for 30 days.  Dispense: 30 Capsule; Refill: 0  - phentermine 37.5 MG capsule; Take 1 Capsule by mouth every morning for 30 days.  Dispense: 30 Capsule; Refill: 0  - PAIN MANAGEMENT SCRN, UR; Future    2. Hemorrhoids, unspecified hemorrhoid type  - docusate sodium (COLACE) 100 MG Cap; Take 1 Capsule by mouth 1 time a day as needed for Constipation.  Dispense: 60 Capsule; Refill: 0  - hydrocortisone (ANUSOL-HC) 25 MG Suppos; Insert 1 Suppository into the rectum every 12 hours.  Dispense: 24 Suppository; Refill: 0  - hydrocortisone 1 % Cream; Apply 1 Application topically 2 times a day.  Dispense: 45 g; Refill: 0  - polyethylene glycol 3350 (MIRALAX) 17 GM/SCOOP Powder; Take 17 g by mouth 1 time a day as needed (constipation).  Dispense: 507 g; Refill: 0    3. Constipation, unspecified constipation type  - docusate sodium (COLACE) 100 MG Cap; Take 1 Capsule by mouth 1 time a day as needed for Constipation.  Dispense: 60 Capsule; Refill: 0  - polyethylene glycol 3350 (MIRALAX) 17 GM/SCOOP Powder; Take 17 g by mouth 1 time a day as needed (constipation).  Dispense: 507 g; Refill: 0    Educated in proper administration of medication(s) ordered today including safety, possible SE, risks, benefits, rationale and alternatives to therapy.       Return in about 3 months (around 5/15/2023) for weight loss management.    Please note that this dictation was created using voice recognition software. I have made every reasonable attempt to correct obvious errors, but I expect that there are errors of grammar and possibly content that I did not discover before finalizing the note.

## 2023-02-17 ASSESSMENT — ENCOUNTER SYMPTOMS
SPUTUM PRODUCTION: 0
FEVER: 0
ROS GI COMMENTS: HEMORRHOID
CONSTITUTIONAL NEGATIVE: 1
SHORTNESS OF BREATH: 0
PALPITATIONS: 0
PSYCHIATRIC NEGATIVE: 1
NEUROLOGICAL NEGATIVE: 1
CONSTIPATION: 1
COUGH: 0

## 2023-02-17 NOTE — ASSESSMENT & PLAN NOTE
Starting Weight - 212 lbs  Weight in today at 194 lbs (- 18 lbs)    She would like to continue with phentermine; she is attending gym daily and has good appetite control with this. She understands this is a stimulant with dependency risks. She understands to avoid pregnancy with stimulant due to fetal health risks and plans to either abstain or use condoms when sexually active with .   Having constipation since starting phentermine which is aggravating her hemorrhoid. She is on nightly fiber and staying hydrated. She will add miralax + stool softner prn. Sent in prescription for anusol supp & steroid cream to help with discomfort.     PDMP reviewed, appropriate; last refilled phentermine 37.5mg #30 on 12/22/2022  CS agreement signed 11/15/2022; UDS collected today for med refill  She understands cannot get pregnant with stimulant and plans to either abstain or use condoms when active with .   - continue phentermine 37.5mg 1 tab QD. She plans to take on days she will go to the gym. She is anticipating will only need for about 3 more months to maintain momentum with gym; refilled for 3 months  - Next weigh in at 3 months;

## 2023-02-19 ENCOUNTER — TELEPHONE (OUTPATIENT)
Dept: URGENT CARE | Facility: PHYSICIAN GROUP | Age: 38
End: 2023-02-19
Payer: COMMERCIAL

## 2023-02-21 RX ORDER — PHENTERMINE HYDROCHLORIDE 37.5 MG/1
37.5 TABLET ORAL
Qty: 30 TABLET | Refills: 0 | Status: SHIPPED | OUTPATIENT
Start: 2023-02-21 | End: 2023-03-23

## 2023-02-27 DIAGNOSIS — K64.9 HEMORRHOIDS, UNSPECIFIED HEMORRHOID TYPE: ICD-10-CM

## 2023-02-27 RX ORDER — HYDROCORTISONE ACETATE 25 MG/1
25 SUPPOSITORY RECTAL EVERY 12 HOURS
Qty: 24 SUPPOSITORY | Refills: 0 | Status: SHIPPED | OUTPATIENT
Start: 2023-02-27 | End: 2023-04-07 | Stop reason: SDUPTHER

## 2023-03-03 ENCOUNTER — PATIENT MESSAGE (OUTPATIENT)
Dept: MEDICAL GROUP | Facility: PHYSICIAN GROUP | Age: 38
End: 2023-03-03
Payer: COMMERCIAL

## 2023-03-03 DIAGNOSIS — K64.9 HEMORRHOIDS, UNSPECIFIED HEMORRHOID TYPE: ICD-10-CM

## 2023-03-07 NOTE — PROGRESS NOTES
1. Hemorrhoids, unspecified hemorrhoid type  Chronic hemorrhoids > 5 years. Patient would like this repaired if possible with banding.  - Referral to General Surgery

## 2023-04-03 ENCOUNTER — TELEPHONE (OUTPATIENT)
Dept: MEDICAL GROUP | Facility: PHYSICIAN GROUP | Age: 38
End: 2023-04-03
Payer: COMMERCIAL

## 2023-04-03 DIAGNOSIS — E66.9 OBESITY (BMI 30-39.9): ICD-10-CM

## 2023-04-03 NOTE — TELEPHONE ENCOUNTER
Received fax from [facility] pharmacy that patient stated she should be able to fill 04/01/2023 and that she is wanting tablets and not the capsules.  Pharmacy is requesting new prescription.

## 2023-04-04 RX ORDER — PHENTERMINE HYDROCHLORIDE 37.5 MG/1
37.5 TABLET ORAL
Qty: 30 TABLET | Refills: 0 | Status: SHIPPED | OUTPATIENT
Start: 2023-04-04 | End: 2023-05-04

## 2023-04-07 DIAGNOSIS — K64.9 HEMORRHOIDS, UNSPECIFIED HEMORRHOID TYPE: ICD-10-CM

## 2023-04-07 RX ORDER — HYDROCORTISONE ACETATE 25 MG/1
25 SUPPOSITORY RECTAL EVERY 12 HOURS
Qty: 24 SUPPOSITORY | Refills: 0 | Status: SHIPPED | OUTPATIENT
Start: 2023-04-07

## 2023-04-07 NOTE — TELEPHONE ENCOUNTER
Received request via: Pharmacy    Was the patient seen in the last year in this department? Yes 2/15/23    Does the patient have an active prescription (recently filled or refills available) for medication(s) requested? No    Does the patient have halfway Plus and need 100 day supply (blood pressure, diabetes and cholesterol meds only)? Patient does not have SCP

## 2023-09-27 ENCOUNTER — APPOINTMENT (OUTPATIENT)
Dept: MEDICAL GROUP | Facility: PHYSICIAN GROUP | Age: 38
End: 2023-09-27
Payer: COMMERCIAL

## 2023-10-10 RX ORDER — SODIUM FLUORIDE 5 MG/ML
PASTE, DENTIFRICE DENTAL DAILY
COMMUNITY
Start: 2023-07-24

## 2023-10-10 RX ORDER — PHENTERMINE HYDROCHLORIDE 37.5 MG/1
37.5 CAPSULE ORAL DAILY
COMMUNITY
Start: 2023-02-15 | End: 2023-10-10

## 2023-10-10 RX ORDER — PHENTERMINE HYDROCHLORIDE 37.5 MG/1
TABLET ORAL
COMMUNITY
Start: 2023-02-01

## 2023-10-11 ENCOUNTER — APPOINTMENT (OUTPATIENT)
Dept: MEDICAL GROUP | Facility: PHYSICIAN GROUP | Age: 38
End: 2023-10-11
Payer: COMMERCIAL

## 2023-11-08 ENCOUNTER — APPOINTMENT (OUTPATIENT)
Dept: MEDICAL GROUP | Facility: PHYSICIAN GROUP | Age: 38
End: 2023-11-08
Payer: COMMERCIAL

## 2023-12-22 RX ORDER — HYDROCORTISONE 10 MG/G
1 CREAM TOPICAL 2 TIMES DAILY
Qty: 56.8 G | Refills: 0 | Status: SHIPPED | OUTPATIENT
Start: 2023-12-22

## 2023-12-22 NOTE — TELEPHONE ENCOUNTER
Requested Prescriptions     Pending Prescriptions Disp Refills    Hydrocortisone, Perianal, 1 % Cream [Pharmacy Med Name: HYDROCORTISONE 1% CREAM] 56.8 g 0     Sig: APPLY TO AFFECTED AREA TWICE A DAY

## 2024-01-17 ENCOUNTER — APPOINTMENT (OUTPATIENT)
Dept: MEDICAL GROUP | Facility: PHYSICIAN GROUP | Age: 39
End: 2024-01-17
Payer: COMMERCIAL

## 2024-01-24 ENCOUNTER — APPOINTMENT (OUTPATIENT)
Dept: MEDICAL GROUP | Facility: PHYSICIAN GROUP | Age: 39
End: 2024-01-24
Payer: COMMERCIAL

## 2024-03-05 ENCOUNTER — TELEPHONE (OUTPATIENT)
Dept: MEDICAL GROUP | Facility: PHYSICIAN GROUP | Age: 39
End: 2024-03-05
Payer: COMMERCIAL

## 2024-03-05 NOTE — LETTER
3/5/2024            Kristal Lopez  [address] [place] [address]              Dear Kristal,    Your care is very important to us, and we have noticed that on 02/28/2024, you missed your appointment with Huong Patricia D.N.P. at [facility]       We’re committed to providing you with the best care possible. Your appointment time is reserved for you and your provider to discuss any current or new health concerns and, together, determine the best plan of care for you. Please call [phone] to reschedule at your earliest convenience.        In some cases, [facility] offers additional resources to make your healthcare more accessible, including transportation assistance, financial assistance and virtual visits. To learn more about these resources, please call [phone].       In order to keep you as informed as possible, below is a brief summary of our policy regarding missed appointments:        If a patient “No Shows”??three (3) or more appointments within a rolling 12-month           period, they may be dismissed from the practice for failure to follow clinician      recommendations.     If you have any concerns regarding the care you are receiving, please talk with your provider or call the office at [phone] and request to speak with the Practice . We’re committed to providing excellent care, and your feedback is invaluable.          Sincerely,     Huong Patricia D.N.P.

## 2024-05-14 ENCOUNTER — DOCUMENTATION (OUTPATIENT)
Dept: HEALTH INFORMATION MANAGEMENT | Facility: OTHER | Age: 39
End: 2024-05-14
Payer: COMMERCIAL

## 2024-05-22 ENCOUNTER — APPOINTMENT (OUTPATIENT)
Dept: MEDICAL GROUP | Facility: PHYSICIAN GROUP | Age: 39
End: 2024-05-22
Payer: COMMERCIAL

## 2024-08-29 ENCOUNTER — DOCUMENTATION (OUTPATIENT)
Dept: HEALTH INFORMATION MANAGEMENT | Facility: OTHER | Age: 39
End: 2024-08-29

## 2024-11-22 ENCOUNTER — RESEARCH ENCOUNTER (OUTPATIENT)
Dept: RESEARCH | Facility: MEDICAL CENTER | Age: 39
End: 2024-11-22

## 2024-11-22 DIAGNOSIS — Z00.6 CLINICAL TRIAL PARTICIPANT: ICD-10-CM

## 2024-12-05 ENCOUNTER — APPOINTMENT (OUTPATIENT)
Dept: MEDICAL GROUP | Facility: CLINIC | Age: 39
End: 2024-12-05
Payer: COMMERCIAL

## 2024-12-19 ENCOUNTER — OFFICE VISIT (OUTPATIENT)
Dept: URGENT CARE | Facility: PHYSICIAN GROUP | Age: 39
End: 2024-12-19
Payer: COMMERCIAL

## 2024-12-19 VITALS
WEIGHT: 224.76 LBS | SYSTOLIC BLOOD PRESSURE: 116 MMHG | OXYGEN SATURATION: 98 % | HEIGHT: 64 IN | RESPIRATION RATE: 16 BRPM | TEMPERATURE: 97.5 F | BODY MASS INDEX: 38.37 KG/M2 | DIASTOLIC BLOOD PRESSURE: 72 MMHG | HEART RATE: 75 BPM

## 2024-12-19 DIAGNOSIS — R05.1 ACUTE COUGH: ICD-10-CM

## 2024-12-19 PROCEDURE — 3074F SYST BP LT 130 MM HG: CPT | Performed by: PHYSICIAN ASSISTANT

## 2024-12-19 PROCEDURE — 99213 OFFICE O/P EST LOW 20 MIN: CPT | Performed by: PHYSICIAN ASSISTANT

## 2024-12-19 PROCEDURE — 3078F DIAST BP <80 MM HG: CPT | Performed by: PHYSICIAN ASSISTANT

## 2024-12-19 RX ORDER — DEXTROMETHORPHAN HYDROBROMIDE AND PROMETHAZINE HYDROCHLORIDE 15; 6.25 MG/5ML; MG/5ML
5 SYRUP ORAL NIGHTLY PRN
Qty: 120 ML | Refills: 0 | Status: SHIPPED | OUTPATIENT
Start: 2024-12-19

## 2024-12-19 RX ORDER — METHYLPREDNISOLONE 4 MG/1
TABLET ORAL
Qty: 21 TABLET | Refills: 0 | Status: SHIPPED | OUTPATIENT
Start: 2024-12-19

## 2024-12-19 RX ORDER — BENZONATATE 100 MG/1
100 CAPSULE ORAL 3 TIMES DAILY PRN
Qty: 60 CAPSULE | Refills: 0 | Status: SHIPPED | OUTPATIENT
Start: 2024-12-19

## 2024-12-19 ASSESSMENT — ENCOUNTER SYMPTOMS
WHEEZING: 0
SORE THROAT: 1
FEVER: 0
NAUSEA: 0
VOMITING: 0
HEADACHES: 0
DIARRHEA: 0
COUGH: 1
SHORTNESS OF BREATH: 0
EYE DISCHARGE: 0
EYE REDNESS: 0

## 2024-12-20 NOTE — PROGRESS NOTES
Huy Lopez is a 39 y.o. female who presents with Cough (Cough, cold Sx, heavy breathing, has been taking OTC medications for cough but not helping  X 3 weeks )            Cough  This is a new problem. Episode onset: x 3 weeks ago. The problem has been unchanged. The cough is Productive of sputum. Associated symptoms include a sore throat (The patient reports an intermittent sore throat.). Pertinent negatives include no chest pain, ear pain, eye redness, fever, headaches, shortness of breath or wheezing. She has tried OTC cough suppressant (The patient has tried multiple over-the-counter medications.  She has also to started taking a leftover azithromycin prescription.  Patient reports no improvement of her symptoms despite taking the antibiotic.) for the symptoms.     PMH:  has a past medical history of Depressed affect (1/26/2016) and Pregnancy.  MEDS:   Current Outpatient Medications:     hydrocortisone 2.5 % Cream topical cream, APPLY DIME SIZE AMOUNT TOPICALLY TO AFFECTED AREA ONCE DAILY (Patient not taking: Reported on 12/19/2024), Disp: 28.35 g, Rfl: 1    Hydrocortisone, Perianal, 1 % Cream, APPLY TO AFFECTED AREA TWICE A DAY (Patient not taking: Reported on 12/19/2024), Disp: 56.8 g, Rfl: 0    phentermine (ADIPEX-P) 37.5 MG tablet, Take 1 Tablet by mouth every morning before breakfast for 90 days. (Patient not taking: Reported on 12/19/2024), Disp: , Rfl:     SODIUM FLUORIDE, DENTAL GEL, 1.1 % Cream, Apply  to teeth every day. (Patient not taking: Reported on 12/19/2024), Disp: , Rfl:     hydrocortisone (ANUSOL-HC) 25 MG Suppos, Insert 1 Suppository into the rectum every 12 hours. (Patient not taking: Reported on 12/19/2024), Disp: 24 Suppository, Rfl: 0    docusate sodium (COLACE) 100 MG Cap, Take 1 Capsule by mouth 1 time a day as needed for Constipation. (Patient not taking: Reported on 12/19/2024), Disp: 60 Capsule, Rfl: 0    hydrocortisone 1 % Cream, Apply 1 Application topically 2  "times a day. (Patient not taking: Reported on 12/19/2024), Disp: 45 g, Rfl: 0    polyethylene glycol 3350 (MIRALAX) 17 GM/SCOOP Powder, Take 17 g by mouth 1 time a day as needed (constipation). (Patient not taking: Reported on 12/19/2024), Disp: 507 g, Rfl: 0  ALLERGIES: No Known Allergies  SURGHX:   Past Surgical History:   Procedure Laterality Date    OTHER      LASIK     SOCHX:  reports that she has never smoked. She has never used smokeless tobacco. She reports that she does not drink alcohol and does not use drugs.  FH: Family history was reviewed, no pertinent findings to report      Review of Systems   Constitutional:  Positive for malaise/fatigue. Negative for fever.   HENT:  Positive for sore throat (The patient reports an intermittent sore throat.). Negative for congestion and ear pain.    Eyes:  Negative for discharge and redness.   Respiratory:  Positive for cough. Negative for shortness of breath and wheezing.    Cardiovascular:  Negative for chest pain.   Gastrointestinal:  Negative for diarrhea, nausea and vomiting.   Neurological:  Negative for headaches.              Objective     /72 (BP Location: Left arm, Patient Position: Sitting, BP Cuff Size: Adult)   Pulse 75   Temp 36.4 °C (97.5 °F) (Temporal)   Resp 16   Ht 1.626 m (5' 4\")   Wt 102 kg (224 lb 12.1 oz)   SpO2 98%   BMI 38.58 kg/m²      Physical Exam  Constitutional:       General: She is not in acute distress.     Appearance: Normal appearance.   HENT:      Head: Normocephalic and atraumatic.      Right Ear: Tympanic membrane, ear canal and external ear normal.      Left Ear: Tympanic membrane, ear canal and external ear normal.      Nose: Nose normal.      Mouth/Throat:      Mouth: Mucous membranes are moist.      Pharynx: Oropharynx is clear. No posterior oropharyngeal erythema.   Eyes:      Extraocular Movements: Extraocular movements intact.      Conjunctiva/sclera: Conjunctivae normal.   Cardiovascular:      Rate and " Rhythm: Normal rate and regular rhythm.      Heart sounds: Normal heart sounds.   Pulmonary:      Effort: Pulmonary effort is normal. No respiratory distress.      Breath sounds: Normal breath sounds. No wheezing.   Musculoskeletal:         General: Normal range of motion.      Cervical back: Normal range of motion and neck supple.   Skin:     General: Skin is warm and dry.   Neurological:      Mental Status: She is alert and oriented to person, place, and time.                             Assessment & Plan        Assessment & Plan  Acute cough    Orders:    methylPREDNISolone (MEDROL DOSEPAK) 4 MG Tablet Therapy Pack; Follow schedule on package instructions.    benzonatate (TESSALON) 100 MG Cap; Take 1 Capsule by mouth 3 times a day as needed for Cough.    promethazine-dextromethorphan (PROMETHAZINE-DM) 6.25-15 MG/5ML syrup; Take 5 mL by mouth at bedtime as needed for Cough.  -- Advised the patient to only take this medication at night, as it may cause drowsiness. Instructed the patient not to take the medication while at work, driving, or operating machinery.    The patient's presenting symptoms and physical exam findings are consistent with an acute cough.  The patient's physical exam today in clinic was normal.  The patient's lungs were clear to auscultation without wheezing or rhonchi, and her pulse ox was within normal limits.  The patient is nontoxic and appears in no acute distress.  The patient's vital signs are stable and within normal limits.  She is afebrile today in clinic.  Based on the patient's presenting symptoms and physical exam endings, I have low clinical suspicion for an acute lower respiratory tract infection, such as pneumonia.  Advised the patient to complete the azithromycin which she started on her own.  Informed the patient her symptoms are likely related to a postviral cough syndrome.  Will prescribe the patient a Medrol Dosepak for her current symptoms.  Will also prescribe the patient  Tessalon and Promethazine DM for symptomatic relief of her cough.  Informed the patient the Promethazine DM may cause drowsiness.  Advised the patient not to take his medication while at work, driving, operating machinery.  The patient verbalized understanding.  Recommend OTC medications and supportive care for symptomatic management.  Recommend the patient follow-up with primary care as needed.  Discussed return precautions with the patient, and she verbalized understanding.    Differential diagnoses, supportive care, and indications for immediate follow-up discussed with patient.   Instructed to return to clinic or nearest emergency department for any change in condition, further concerns, or worsening of symptoms.    OTC Tylenol or Motrin for fever/discomfort.  OTC cough/cold medication for symptomatic relief  OTC Supportive Care for Congestion - saline nasal spray or neti pot  Drink plenty of fluids  Follow-up with PCP  Return to clinic or go to the ED if symptoms worsen or fail to improve, or if the patient should develop worsening/increasing cough, congestion, ear pain, sore throat, shortness of breath, wheezing, chest pain, fever/chills, and/or any concerning symptoms.    Discussed plan with the patient, and she agrees to the above.     I personally reviewed prior external notes and test results pertinent to today's visit.  I have independently reviewed and interpreted all diagnostics ordered during this urgent care visit.     Please note that this dictation was created using voice recognition software. I have made every reasonable attempt to correct obvious errors, but I expect that there may be errors of grammar and possibly content that I did not discover before finalizing the note.     This note was electronically signed by Ashanti Lu PA-C

## 2025-08-01 ENCOUNTER — HOSPITAL ENCOUNTER (OUTPATIENT)
Dept: LAB | Facility: MEDICAL CENTER | Age: 40
End: 2025-08-01
Attending: FAMILY MEDICINE
Payer: COMMERCIAL

## 2025-08-01 LAB
ALBUMIN SERPL BCP-MCNC: 4 G/DL (ref 3.2–4.9)
ALBUMIN/GLOB SERPL: 1.2 G/DL
ALP SERPL-CCNC: 110 U/L (ref 30–99)
ALT SERPL-CCNC: 29 U/L (ref 2–50)
ANION GAP SERPL CALC-SCNC: 12 MMOL/L (ref 7–16)
AST SERPL-CCNC: 25 U/L (ref 12–45)
BASOPHILS # BLD AUTO: 0.4 % (ref 0–1.8)
BASOPHILS # BLD: 0.03 K/UL (ref 0–0.12)
BILIRUB SERPL-MCNC: 0.3 MG/DL (ref 0.1–1.5)
BUN SERPL-MCNC: 9 MG/DL (ref 8–22)
CALCIUM ALBUM COR SERPL-MCNC: 9.2 MG/DL (ref 8.5–10.5)
CALCIUM SERPL-MCNC: 9.2 MG/DL (ref 8.5–10.5)
CHLORIDE SERPL-SCNC: 105 MMOL/L (ref 96–112)
CHOLEST SERPL-MCNC: 164 MG/DL (ref 100–199)
CO2 SERPL-SCNC: 22 MMOL/L (ref 20–33)
CREAT SERPL-MCNC: 0.71 MG/DL (ref 0.5–1.4)
EOSINOPHIL # BLD AUTO: 0.18 K/UL (ref 0–0.51)
EOSINOPHIL NFR BLD: 2.3 % (ref 0–6.9)
ERYTHROCYTE [DISTWIDTH] IN BLOOD BY AUTOMATED COUNT: 39.1 FL (ref 35.9–50)
EST. AVERAGE GLUCOSE BLD GHB EST-MCNC: 128 MG/DL
FERRITIN SERPL-MCNC: 53.9 NG/ML (ref 10–291)
FOLATE SERPL-MCNC: 34.4 NG/ML
GFR SERPLBLD CREATININE-BSD FMLA CKD-EPI: 109 ML/MIN/1.73 M 2
GLOBULIN SER CALC-MCNC: 3.3 G/DL (ref 1.9–3.5)
GLUCOSE SERPL-MCNC: 103 MG/DL (ref 65–99)
HBA1C MFR BLD: 6.1 % (ref 4–5.6)
HCT VFR BLD AUTO: 41.9 % (ref 37–47)
HDLC SERPL-MCNC: 50 MG/DL
HGB BLD-MCNC: 13.4 G/DL (ref 12–16)
IMM GRANULOCYTES # BLD AUTO: 0.02 K/UL (ref 0–0.11)
IMM GRANULOCYTES NFR BLD AUTO: 0.3 % (ref 0–0.9)
IRON SATN MFR SERPL: 18 % (ref 15–55)
IRON SERPL-MCNC: 53 UG/DL (ref 40–170)
LDLC SERPL CALC-MCNC: 89 MG/DL
LYMPHOCYTES # BLD AUTO: 2.28 K/UL (ref 1–4.8)
LYMPHOCYTES NFR BLD: 28.8 % (ref 22–41)
MCH RBC QN AUTO: 26.7 PG (ref 27–33)
MCHC RBC AUTO-ENTMCNC: 32 G/DL (ref 32.2–35.5)
MCV RBC AUTO: 83.6 FL (ref 81.4–97.8)
MONOCYTES # BLD AUTO: 0.28 K/UL (ref 0–0.85)
MONOCYTES NFR BLD AUTO: 3.5 % (ref 0–13.4)
NEUTROPHILS # BLD AUTO: 5.12 K/UL (ref 1.82–7.42)
NEUTROPHILS NFR BLD: 64.7 % (ref 44–72)
NRBC # BLD AUTO: 0 K/UL
NRBC BLD-RTO: 0 /100 WBC (ref 0–0.2)
PLATELET # BLD AUTO: 259 K/UL (ref 164–446)
PMV BLD AUTO: 11.4 FL (ref 9–12.9)
POTASSIUM SERPL-SCNC: 4.1 MMOL/L (ref 3.6–5.5)
PROT SERPL-MCNC: 7.3 G/DL (ref 6–8.2)
RBC # BLD AUTO: 5.01 M/UL (ref 4.2–5.4)
SODIUM SERPL-SCNC: 139 MMOL/L (ref 135–145)
T3 SERPL-MCNC: 168 NG/DL (ref 60–181)
T4 SERPL-MCNC: 10.3 UG/DL (ref 4–12)
TIBC SERPL-MCNC: 299 UG/DL (ref 250–450)
TRIGL SERPL-MCNC: 124 MG/DL (ref 0–149)
TSH SERPL-ACNC: 2.44 UIU/ML (ref 0.38–5.33)
UIBC SERPL-MCNC: 246 UG/DL (ref 110–370)
VIT B12 SERPL-MCNC: 374 PG/ML (ref 211–911)
WBC # BLD AUTO: 7.9 K/UL (ref 4.8–10.8)

## 2025-08-01 PROCEDURE — 85025 COMPLETE CBC W/AUTO DIFF WBC: CPT

## 2025-08-01 PROCEDURE — 84436 ASSAY OF TOTAL THYROXINE: CPT

## 2025-08-01 PROCEDURE — 84443 ASSAY THYROID STIM HORMONE: CPT

## 2025-08-01 PROCEDURE — 83550 IRON BINDING TEST: CPT

## 2025-08-01 PROCEDURE — 82746 ASSAY OF FOLIC ACID SERUM: CPT

## 2025-08-01 PROCEDURE — 80053 COMPREHEN METABOLIC PANEL: CPT

## 2025-08-01 PROCEDURE — 83540 ASSAY OF IRON: CPT

## 2025-08-01 PROCEDURE — 80061 LIPID PANEL: CPT

## 2025-08-01 PROCEDURE — 82607 VITAMIN B-12: CPT

## 2025-08-01 PROCEDURE — 84480 ASSAY TRIIODOTHYRONINE (T3): CPT

## 2025-08-01 PROCEDURE — 82728 ASSAY OF FERRITIN: CPT

## 2025-08-01 PROCEDURE — 83036 HEMOGLOBIN GLYCOSYLATED A1C: CPT

## 2025-08-01 PROCEDURE — 36415 COLL VENOUS BLD VENIPUNCTURE: CPT
